# Patient Record
Sex: FEMALE | Race: OTHER | HISPANIC OR LATINO | URBAN - METROPOLITAN AREA
[De-identification: names, ages, dates, MRNs, and addresses within clinical notes are randomized per-mention and may not be internally consistent; named-entity substitution may affect disease eponyms.]

---

## 2021-09-10 ENCOUNTER — EMERGENCY (EMERGENCY)
Facility: HOSPITAL | Age: 26
LOS: 1 days | Discharge: HOME | End: 2021-09-10
Attending: EMERGENCY MEDICINE
Payer: MEDICAID

## 2021-09-10 VITALS
SYSTOLIC BLOOD PRESSURE: 104 MMHG | RESPIRATION RATE: 18 BRPM | DIASTOLIC BLOOD PRESSURE: 66 MMHG | HEART RATE: 65 BPM | TEMPERATURE: 98 F | OXYGEN SATURATION: 98 %

## 2021-09-10 LAB
ALBUMIN SERPL ELPH-MCNC: 3.6 G/DL — SIGNIFICANT CHANGE UP (ref 3.5–5)
ALP SERPL-CCNC: 106 U/L — SIGNIFICANT CHANGE UP (ref 40–120)
ALT FLD-CCNC: 24 U/L DA — SIGNIFICANT CHANGE UP (ref 10–60)
ANION GAP SERPL CALC-SCNC: 8 MMOL/L — SIGNIFICANT CHANGE UP (ref 5–17)
APPEARANCE UR: CLEAR — SIGNIFICANT CHANGE UP
AST SERPL-CCNC: 16 U/L — SIGNIFICANT CHANGE UP (ref 10–40)
BASOPHILS # BLD AUTO: 0.03 K/UL — SIGNIFICANT CHANGE UP (ref 0–0.2)
BASOPHILS NFR BLD AUTO: 0.4 % — SIGNIFICANT CHANGE UP (ref 0–2)
BILIRUB SERPL-MCNC: 0.6 MG/DL — SIGNIFICANT CHANGE UP (ref 0.2–1.2)
BILIRUB UR-MCNC: NEGATIVE — SIGNIFICANT CHANGE UP
BUN SERPL-MCNC: 8 MG/DL — SIGNIFICANT CHANGE UP (ref 7–18)
CALCIUM SERPL-MCNC: 8.6 MG/DL — SIGNIFICANT CHANGE UP (ref 8.4–10.5)
CHLORIDE SERPL-SCNC: 104 MMOL/L — SIGNIFICANT CHANGE UP (ref 96–108)
CO2 SERPL-SCNC: 24 MMOL/L — SIGNIFICANT CHANGE UP (ref 22–31)
COLOR SPEC: YELLOW — SIGNIFICANT CHANGE UP
CREAT SERPL-MCNC: 0.59 MG/DL — SIGNIFICANT CHANGE UP (ref 0.5–1.3)
DIFF PNL FLD: NEGATIVE — SIGNIFICANT CHANGE UP
EOSINOPHIL # BLD AUTO: 0.04 K/UL — SIGNIFICANT CHANGE UP (ref 0–0.5)
EOSINOPHIL NFR BLD AUTO: 0.6 % — SIGNIFICANT CHANGE UP (ref 0–6)
GLUCOSE SERPL-MCNC: 82 MG/DL — SIGNIFICANT CHANGE UP (ref 70–99)
GLUCOSE UR QL: NEGATIVE — SIGNIFICANT CHANGE UP
HCG SERPL-ACNC: HIGH MIU/ML
HCT VFR BLD CALC: 36.8 % — SIGNIFICANT CHANGE UP (ref 34.5–45)
HGB BLD-MCNC: 12.3 G/DL — SIGNIFICANT CHANGE UP (ref 11.5–15.5)
IMM GRANULOCYTES NFR BLD AUTO: 0.4 % — SIGNIFICANT CHANGE UP (ref 0–1.5)
KETONES UR-MCNC: NEGATIVE — SIGNIFICANT CHANGE UP
LEUKOCYTE ESTERASE UR-ACNC: NEGATIVE — SIGNIFICANT CHANGE UP
LYMPHOCYTES # BLD AUTO: 1.75 K/UL — SIGNIFICANT CHANGE UP (ref 1–3.3)
LYMPHOCYTES # BLD AUTO: 24.8 % — SIGNIFICANT CHANGE UP (ref 13–44)
MCHC RBC-ENTMCNC: 29 PG — SIGNIFICANT CHANGE UP (ref 27–34)
MCHC RBC-ENTMCNC: 33.4 GM/DL — SIGNIFICANT CHANGE UP (ref 32–36)
MCV RBC AUTO: 86.8 FL — SIGNIFICANT CHANGE UP (ref 80–100)
MONOCYTES # BLD AUTO: 0.5 K/UL — SIGNIFICANT CHANGE UP (ref 0–0.9)
MONOCYTES NFR BLD AUTO: 7.1 % — SIGNIFICANT CHANGE UP (ref 2–14)
NEUTROPHILS # BLD AUTO: 4.71 K/UL — SIGNIFICANT CHANGE UP (ref 1.8–7.4)
NEUTROPHILS NFR BLD AUTO: 66.7 % — SIGNIFICANT CHANGE UP (ref 43–77)
NITRITE UR-MCNC: NEGATIVE — SIGNIFICANT CHANGE UP
NRBC # BLD: 0 /100 WBCS — SIGNIFICANT CHANGE UP (ref 0–0)
PH UR: 6.5 — SIGNIFICANT CHANGE UP (ref 5–8)
PLATELET # BLD AUTO: 191 K/UL — SIGNIFICANT CHANGE UP (ref 150–400)
POTASSIUM SERPL-MCNC: 3.5 MMOL/L — SIGNIFICANT CHANGE UP (ref 3.5–5.3)
POTASSIUM SERPL-SCNC: 3.5 MMOL/L — SIGNIFICANT CHANGE UP (ref 3.5–5.3)
PROT SERPL-MCNC: 7.8 G/DL — SIGNIFICANT CHANGE UP (ref 6–8.3)
PROT UR-MCNC: 15
RBC # BLD: 4.24 M/UL — SIGNIFICANT CHANGE UP (ref 3.8–5.2)
RBC # FLD: 13 % — SIGNIFICANT CHANGE UP (ref 10.3–14.5)
SODIUM SERPL-SCNC: 136 MMOL/L — SIGNIFICANT CHANGE UP (ref 135–145)
SP GR SPEC: 1 — LOW (ref 1.01–1.02)
UROBILINOGEN FLD QL: NEGATIVE — SIGNIFICANT CHANGE UP
WBC # BLD: 7.06 K/UL — SIGNIFICANT CHANGE UP (ref 3.8–10.5)
WBC # FLD AUTO: 7.06 K/UL — SIGNIFICANT CHANGE UP (ref 3.8–10.5)

## 2021-09-10 PROCEDURE — 76801 OB US < 14 WKS SINGLE FETUS: CPT | Mod: 26

## 2021-09-10 PROCEDURE — 86901 BLOOD TYPING SEROLOGIC RH(D): CPT

## 2021-09-10 PROCEDURE — 86900 BLOOD TYPING SEROLOGIC ABO: CPT

## 2021-09-10 PROCEDURE — 76817 TRANSVAGINAL US OBSTETRIC: CPT

## 2021-09-10 PROCEDURE — 76801 OB US < 14 WKS SINGLE FETUS: CPT

## 2021-09-10 PROCEDURE — 85025 COMPLETE CBC W/AUTO DIFF WBC: CPT

## 2021-09-10 PROCEDURE — 86850 RBC ANTIBODY SCREEN: CPT

## 2021-09-10 PROCEDURE — 81001 URINALYSIS AUTO W/SCOPE: CPT

## 2021-09-10 PROCEDURE — 99284 EMERGENCY DEPT VISIT MOD MDM: CPT | Mod: 25

## 2021-09-10 PROCEDURE — 36415 COLL VENOUS BLD VENIPUNCTURE: CPT

## 2021-09-10 PROCEDURE — 80053 COMPREHEN METABOLIC PANEL: CPT

## 2021-09-10 PROCEDURE — 76817 TRANSVAGINAL US OBSTETRIC: CPT | Mod: 26

## 2021-09-10 PROCEDURE — 87086 URINE CULTURE/COLONY COUNT: CPT

## 2021-09-10 PROCEDURE — 99285 EMERGENCY DEPT VISIT HI MDM: CPT

## 2021-09-10 PROCEDURE — 84702 CHORIONIC GONADOTROPIN TEST: CPT

## 2021-09-10 NOTE — ED PROVIDER NOTE - OBJECTIVE STATEMENT
Patient , LMP  with positive home pregnancy test. Yesterday, began to have intermittent, crampy, 8/10 low back pain and pelvic pain. States it feels like contraction. No fever, cp, sob, n/v/d, dysuria, urgency, frequency, vaginal bleeding, vaginal discharge. Has not seen an ob-gyn in this pregnancy.

## 2021-09-10 NOTE — ED ADULT NURSE NOTE - DISCHARGE DATE/TIME
MD talked to pt. Pt informed that the policy is no visitors right now and MD suggests she stay in the hospital however she is not required to stay.    10-Sep-2021 14:56

## 2021-09-10 NOTE — ED PROVIDER NOTE - PATIENT PORTAL LINK FT
You can access the FollowMyHealth Patient Portal offered by [facility] by registering at the following website: [email or web address]. By joining [employer]’s FollowMyHealth portal, you will also be able to view your health information using other applications (apps) compatible with our system.

## 2021-09-10 NOTE — ED PROVIDER NOTE - NSFOLLOWUPINSTRUCTIONS_ED_ALL_ED_FT
Amenaza de aborto natural    LO QUE NECESITA SABER:    Grace amenaza de aborto ocurre cuando se tiene sangrado vaginal dentro de las primeras 20 semanas de embarazo. Eso indica que podría ocurrir un aborto natural. Grace amenaza de un aborto natural también se le conoce jinny grace amenaza de pérdida del embarazo.    INSTRUCCIONES SOBRE EL SAM HOSPITALARIA:    Regrese a la doyle de emergencias si:  •Se siente débil o que se desmaya.      •Tiene dolor o cólicos en el abdomen o en la espalda que empeoran.      •Tiene sangrado vaginal que en grace hora empapa por lo menos 1 toalla sanitaria.      •Le sale un material que parece tejido o coágulos grandes.      Llame a foley médico u obstetra si:  •Tiene fiebre.      •Tiene problemas para orinar, siente ardor o la necesidad de orinar con frecuencia.      •Tiene sangrado vaginal nuevo o que empeora.      •Tiene dolor o comezón vaginal o flujo vaginal de color amarillo o jayson o maloliente.      •Usted tiene preguntas o inquietudes acerca de foley condición o cuidado.      Cuidados personales:Los siguientes podrían ayudar a controlar los síntomas y disminuir foley riesgo de un aborto natural:  •No se ponga nada dentro de foley vagina.No tenga relaciones sexuales, no tome duchas vaginales ni use tampones. Estas acciones pueden aumentar foley riesgo de grace infección o de un aborto natural.      •Repose según le indicaron.No practique ningún ejercicio ni actividades vigorosas. Estas actividades pueden causar un parto prematuro o un aborto natural. Pregunte a foley médico cuáles actividades físicas son las apropiadas para usted.      Manténgase saludable jack el embarazo:  •Consuma alimentos saludables y variados.Los alimentos sanos pueden ayudarla a obtener las proteínas y calorías adicionales que usted necesita jack el embarazo. Los alimentos saludables incluyen frutas, verduras, pan integral, productos lácteos bajos en grasa, frijoles, yair magras y pescado. Evite la carne y pescado crudos o que no estén cocinados completamente. Consulte con foley médico en renuka que sea necesario que siga grace dieta especial.  Alimentos saludables           •Kamaili vitaminas prenatales según las indicaciones.Estas ayudan a obtener la cantidad correcta de vitaminas y minerales. También podrían ayudar a disminuir el riesgo de ciertos defectos de nacimiento.      •No consuma alcohol ni drogas ilegales.Estos pueden aumentar el riesgo de un aborto espontáneo o perjudicar al bebé.      •No fume.La nicotina y otros químicos en los cigarrillos y cigarros pueden perjudicar a foley bebé y provocar un aborto natural o un parto prematuro. Pida información a foley médico si usted actualmente fuma y necesita ayuda para dejar de fumar. Los cigarrillos electrónicos o el tabaco sin humo igualmente contienen nicotina. No use estos productos.      •Disminuya el riesgo de grace infección.Siempre lave taj samir antes de comer o preparar alimentos. No pase tiempo con gente que está enferma. Pregúntele a foley médico si necesita vacunas jinny la vacuna contra la gripe o la hepatitis B. Las vacunas pueden disminuir foley riesgo de contraer infecciones que pueden causar un aborto espontáneo.      •Controle taj afecciones médicas.Mantenga bajo control foley presión arterial y azúcar en la graciela. Mantenga un peso saludable jack el embarazo.      Acuda a taj consultas de control con foley médico u obstetra según le indicaron:Es posible que usted deba acudir a consulta con foley ginecólogo frecuentemente para que le ordene ecografías o más exámenes de garciela. Anote taj preguntas para que se acuerde de hacerlas jack taj visitas.       © Copyright [employer] 2021           back to top                          © Copyright [employer] 2021

## 2021-09-11 LAB
CULTURE RESULTS: NO GROWTH — SIGNIFICANT CHANGE UP
SPECIMEN SOURCE: SIGNIFICANT CHANGE UP

## 2021-09-12 ENCOUNTER — EMERGENCY (EMERGENCY)
Facility: HOSPITAL | Age: 26
LOS: 1 days | Discharge: HOME | End: 2021-09-12
Attending: STUDENT IN AN ORGANIZED HEALTH CARE EDUCATION/TRAINING PROGRAM
Payer: MEDICAID

## 2021-09-12 VITALS
OXYGEN SATURATION: 99 % | HEIGHT: 60 IN | HEART RATE: 72 BPM | TEMPERATURE: 98 F | RESPIRATION RATE: 16 BRPM | WEIGHT: 145.06 LBS | DIASTOLIC BLOOD PRESSURE: 70 MMHG | SYSTOLIC BLOOD PRESSURE: 107 MMHG

## 2021-09-12 DIAGNOSIS — O02.1 MISSED ABORTION: ICD-10-CM

## 2021-09-12 PROBLEM — Z78.9 OTHER SPECIFIED HEALTH STATUS: Chronic | Status: ACTIVE | Noted: 2021-09-10

## 2021-09-12 LAB — HCG SERPL-ACNC: HIGH MIU/ML

## 2021-09-12 PROCEDURE — 76817 TRANSVAGINAL US OBSTETRIC: CPT

## 2021-09-12 PROCEDURE — 99285 EMERGENCY DEPT VISIT HI MDM: CPT

## 2021-09-12 PROCEDURE — 99284 EMERGENCY DEPT VISIT MOD MDM: CPT | Mod: 25

## 2021-09-12 PROCEDURE — 76801 OB US < 14 WKS SINGLE FETUS: CPT | Mod: 26

## 2021-09-12 PROCEDURE — 76817 TRANSVAGINAL US OBSTETRIC: CPT | Mod: 26

## 2021-09-12 PROCEDURE — 76802 OB US < 14 WKS ADDL FETUS: CPT

## 2021-09-12 NOTE — CONSULT NOTE ADULT - PROBLEM SELECTOR RECOMMENDATION 9
d/c home   instructions verbalized, patient and  agree and understand plan of care  if s&s worsen return to ER  follow up tomorrow @9am at [phone] [facility] [id number] [phone]  d/w dr Garcia attending on call

## 2021-09-12 NOTE — ED PROVIDER NOTE - NS ED ROS FT
(+) pelvic pain   - no vaginal bleeding/discharge  (-) fevers, chills  (-) dizziness, lightheadedness, vision changes  (-) neck pain, stiffness  (-) cp, palpitations  (-) sob, cough, hemoptysis  (-) abd pain, n/v/d/c   (-) urinary sxs, back pain  (-) weakness, paresthesias

## 2021-09-12 NOTE — ED PROVIDER NOTE - PROGRESS NOTE DETAILS
Labs noted, hcg mildly elevated compared to last  U/s showing questionable yolk sac, no fetal pole, no fhr appreciated w R ovarian cyst  gyn consulted and examined pt - stated likely missed ab and that pt will need to f/up with clinic tomorrow at 9am. per gyn, no concern for ectopic at this time. pt appears comfortable, pelvic benign, abd benign.   stable for dc at this time w close gyn f/up.

## 2021-09-12 NOTE — ED PROVIDER NOTE - PATIENT PORTAL LINK FT
You can access the FollowMyHealth Patient Portal offered by [facility] by registering at the following website: [email or web address]. By joining [employer]’s FollowMyHealth portal, you will also be able to view your health information using other applications (apps) compatible with our system.

## 2021-09-12 NOTE — ED PROVIDER NOTE - OBJECTIVE STATEMENT
25yo F currently pregnant p/w cont pelvic pain. patient was seen in ED for same complaint 2 days ago. u/s performed showing no confirmed IUP, only gestational sac w R adnexal cyst. patient hcg approx 11k and instructed to return in 2 days for hcg f/up. patient returning today for hcg recheck, but also states pain is on R side and has not resolved/improved. patient denies syncope, cp, sob, f/c, ivf or fertilization methods, h/o ectopic, urinary sxs, vaginal bleeding otherwise.

## 2021-09-12 NOTE — CONSULT NOTE ADULT - SUBJECTIVE AND OBJECTIVE BOX
ID#[id number]  HPI: 25yo  LMP  EGA 8.3wks presented on friday to ER w pelvic pain, where she was told she's pregnant and to come back today for rpt bhcg level. Pt states pain still there 5/10 in severity on/off non radiating, no pain meds taken. Denies vb, vaginal discharge; cp, sob, dizziness, palpitations, n/v/d/c, fever; chills. No PNC yet per patient    pob/gynhx:  FT x1, sab x1, no std's; no abn pap smear; no fibroids; reg menses  pmhx: denies  pshx: appendectomy 7yrs ago no comp,  left ankle fx no comp  all: pcn-hives, sob  meds: none  sochx: no etoh/drug/tobacco use    REVIEW OF SYSTEMS: see HPI	    PE:  Vital Signs Last 24 Hrs  T(C): 36.6 (12 Sep 2021 14:27), Max: 36.6 (12 Sep 2021 14:27)  T(F): 97.9 (12 Sep 2021 14:27), Max: 97.9 (12 Sep 2021 14:27)  HR: 72 (12 Sep 2021 14:27) (72 - 72)  BP: 107/70 (12 Sep 2021 14:27) (107/70 - 107/70)  BP(mean): --  RR: 16 (12 Sep 2021 14:27) (16 - 16)  SpO2: 99% (12 Sep 2021 14:27) (99% - 99%)    abd: +bs; soft, nt, nd, no rebound or guarding  pelvis: no cmt; no vaginal bleeding or abnormal discharge; no odor, closed/long, + mild uterine tenderness; uterus approx 8wks size regular in contour, mobile. No adnexal tenderness or masses appreciated b/l     LABS:    HCG Quantitative, Serum (21 @ 15:32)    HCG Quantitative, Serum: 03567: HCG-Quantitative test interpretations: This test is intended only for the  detection & monitoring of pregnancy. For oncology studies order the tumor  marker test “HCG-TM” (hCG-Tumor Marker).  For pregnancy evaluation the reference values are as follows:  Negative:  <=4 mIU/mL  Indeterminate:  5 - 25 mIU/mL (suggest repeat testing in 72 hours)  Positive:  > 25 mIU/mL  Reference Values during Pregnancy:  Weeks after LMP* REFERENCE INTERVAL mIU/mL     3      6 - 71     4      10 - 750     5      220 - 7,100     6      160 - 32,000     7      3,700 - 164,000     8      32,000 - 150,000     9      64,000 - 151,000     10      47,000 - 187,000     12      28,000 - 211,000     14      14,000 - 63,000     15      12,000 - 71,000     16 - 18  8,000 - 58,000  * LMP:  Last Menstrual Period  HCG results of false positive and false negative for pregnancy are rare,  but can occur with this, and other, hCG tests. Lisseth- and post-menopausal  females may have mildly elevated hCG concentrations usually less than 14  mIU/mL that are constant over time. mIU/mL    RADIOLOGY & ADDITIONAL STUDIES:  sono  < from: US Transvaginal, OB (21 @ 17:18) >    EXAM:  US OB TRANSVAGINAL                          EXAM:  US OB LES THAN 14WK EA ADD GES                            PROCEDURE DATE:  2021          INTERPRETATION:  CLINICAL INFORMATION: Pregnancy, hCG rising. Follow-up from prior ultrasound 2days ago with  AP.    LMP: 2021  Estimated Gestational Age by LMP: 8 weeks 4 days    COMPARISON: September 10, 2021    Endovaginal and transabdominal pelvic sonogram. Color and Spectral Doppler was performed.    FINDINGS:  Uterus: 11.4 x 4.2x 5.0 cm    Gestational Sac Size (mean): 1.3 cm  Gestations Sac Shape : Normal.  Crown Rump Length: 3 mm  Estimated Gestational Age: 5 weeks 6 days by crown rump length.  Yolk Sac: Borderline-enlarged measuring 6 to 7 mm.  Fetal Heart Rate: Not identified possibly related to small fetal pole.    Right ovary: 3.8 x 2.3 x 2.0 cm. Intraovarian cyst measuring 1.5 x 1.3 x 1.4 cm. Normal arterial and venous waveforms.  Left ovary: 1.8 x 1.1 x 1.1 cm. Within normal limits. Normal arterial and venous waveforms.    Fluid: None.    IMPRESSION:  Intrauterine gestational sac with small fetal pole and borderline enlarged yolk sac. Fetal heart motion not identified. Findings are suspicious, but not diagnostic of, pregnancy failure. Continued follow-up withserial beta hCG and follow-up pelvic ultrasound is recommended.    Estimated gestational age of 5 weeks 6 days by crown-rump length    --- End of Report ---    < end of copied text >

## 2021-09-12 NOTE — ED PROVIDER NOTE - CLINICAL SUMMARY MEDICAL DECISION MAKING FREE TEXT BOX
pt p/w pelvic pain, recheck hcg s/p hcg 11k 2 days ago in ED w no confirmed IUP  rpt labs, u/s today and reassess

## 2021-09-12 NOTE — ED ADULT NURSE NOTE - NSIMPLEMENTINTERV_GEN_ALL_ED
Implemented All Universal Safety Interventions:  [place] to call system. Call bell, personal items and telephone within reach. Instruct patient to call for assistance. Room bathroom lighting operational. Non-slip footwear when patient is off stretcher. Physically safe environment: no spills, clutter or unnecessary equipment. Stretcher in lowest position, wheels locked, appropriate side rails in place.

## 2021-09-12 NOTE — ED PROVIDER NOTE - PHYSICAL EXAMINATION
Sohrawardy:   VS reviewed  NAD, not ill-appearing  aaox3  rrr  normal resp effort  abd soft, nondistended, +ttp midline and R-sided pelvic region

## 2021-09-20 ENCOUNTER — OUTPATIENT (OUTPATIENT)
Dept: OUTPATIENT SERVICES | Facility: HOSPITAL | Age: 26
LOS: 1 days | End: 2021-09-20
Payer: MEDICAID

## 2021-09-20 ENCOUNTER — APPOINTMENT (OUTPATIENT)
Dept: OBGYN | Facility: CLINIC | Age: 26
End: 2021-09-20
Payer: MEDICAID

## 2021-09-20 VITALS
BODY MASS INDEX: 34.96 KG/M2 | TEMPERATURE: 97.4 F | DIASTOLIC BLOOD PRESSURE: 66 MMHG | HEART RATE: 65 BPM | OXYGEN SATURATION: 100 % | HEIGHT: 62 IN | RESPIRATION RATE: 18 BRPM | WEIGHT: 190 LBS | SYSTOLIC BLOOD PRESSURE: 96 MMHG

## 2021-09-20 DIAGNOSIS — S82.92XA UNSPECIFIED FRACTURE OF LEFT LOWER LEG, INITIAL ENCOUNTER FOR CLOSED FRACTURE: ICD-10-CM

## 2021-09-20 DIAGNOSIS — Z00.00 ENCOUNTER FOR GENERAL ADULT MEDICAL EXAMINATION WITHOUT ABNORMAL FINDINGS: ICD-10-CM

## 2021-09-20 DIAGNOSIS — Z78.9 OTHER SPECIFIED HEALTH STATUS: ICD-10-CM

## 2021-09-20 PROCEDURE — 99204 OFFICE O/P NEW MOD 45 MIN: CPT | Mod: 25

## 2021-09-20 PROCEDURE — G0463: CPT

## 2021-09-20 PROCEDURE — 36415 COLL VENOUS BLD VENIPUNCTURE: CPT

## 2021-09-20 PROCEDURE — 81025 URINE PREGNANCY TEST: CPT

## 2021-09-20 PROCEDURE — 36415 COLL VENOUS BLD VENIPUNCTURE: CPT | Mod: NC

## 2021-09-20 PROCEDURE — 87591 N.GONORRHOEAE DNA AMP PROB: CPT

## 2021-09-20 NOTE — PHYSICAL EXAM
[Appropriately responsive] : appropriately responsive [Alert] : alert [No Acute Distress] : no acute distress [No Lymphadenopathy] : no lymphadenopathy [No Murmurs] : no murmurs [Soft] : soft [Non-tender] : non-tender [Non-distended] : non-distended [No HSM] : No HSM [No Lesions] : no lesions [No Mass] : no mass [Oriented x3] : oriented x3 [Examination Of The Breasts] : a normal appearance [No Masses] : no breast masses were palpable [Labia Majora] : normal [Labia Minora] : normal [No Bleeding] : There was no active vaginal bleeding [Normal] : normal

## 2021-09-20 NOTE — HISTORY OF PRESENT ILLNESS
[N] : Patient does not use contraception [Y] : Positive pregnancy history [FreeTextEntry1] : Annual gyn exam\par \par LMP(07/08/21) - LMP unsure, hx/o irregular periods, with hx/o secondary amenorrhea after last Depo shot (06/2020).   (+) POCT seen at [facility]/ ER on (09/10/21) with f/u on (09/12/21) - early IUP - dated by U/S with JESUS(04/20/22), denies any vaginal bleeding or pelvic pain.   [LMPDate] : 7/8/21 [PGHxTotal] : 3 [[facility]xFulerm] : 1 [PGHxPremature] : 0 [PGHxAbortions] : 1 [[facility] Center[facility]] : 1 [PGHxABInduced] : 0 [PGHxABSpont] : 1 [PGHxEctopic] : 0 [PGHxMultBirths] : 0 [unknown] : Patient is unsure of the date of her LMP [Menarche Age: ____] : age at menarche was [unfilled] [Currently Active] : currently active [Men] : men [Vaginal] : vaginal [No] : No

## 2021-09-21 DIAGNOSIS — Z78.9 OTHER SPECIFIED HEALTH STATUS: ICD-10-CM

## 2021-09-21 DIAGNOSIS — Z34.90 ENCOUNTER FOR SUPERVISION OF NORMAL PREGNANCY, UNSPECIFIED, UNSPECIFIED TRIMESTER: ICD-10-CM

## 2021-09-21 DIAGNOSIS — Z11.3 ENCOUNTER FOR SCREENING FOR INFECTIONS WITH A PREDOMINANTLY SEXUAL MODE OF TRANSMISSION: ICD-10-CM

## 2021-09-21 DIAGNOSIS — O20.0 THREATENED ABORTION: ICD-10-CM

## 2021-09-21 DIAGNOSIS — S82.92XA UNSPECIFIED FRACTURE OF LEFT LOWER LEG, INITIAL ENCOUNTER FOR CLOSED FRACTURE: ICD-10-CM

## 2021-09-21 DIAGNOSIS — Z12.39 ENCOUNTER FOR OTHER SCREENING FOR MALIGNANT NEOPLASM OF BREAST: ICD-10-CM

## 2021-09-21 DIAGNOSIS — O26.899 OTHER SPECIFIED PREGNANCY RELATED CONDITIONS, UNSPECIFIED TRIMESTER: ICD-10-CM

## 2021-09-21 LAB
C TRACH RRNA SPEC QL NAA+PROBE: NOT DETECTED
N GONORRHOEA RRNA SPEC QL NAA+PROBE: NOT DETECTED
SOURCE TP AMPLIFICATION: NORMAL

## 2021-09-22 LAB
ABO + RH PNL BLD: NORMAL
BASOPHILS # BLD AUTO: 0.05 K/UL
BASOPHILS NFR BLD AUTO: 0.8 %
BLD GP AB SCN SERPL QL: NORMAL
EOSINOPHIL # BLD AUTO: 0.06 K/UL
EOSINOPHIL NFR BLD AUTO: 0.9 %
HCG SERPL-MCNC: 5903 MIU/ML
HCT VFR BLD CALC: 39.7 %
HGB BLD-MCNC: 13.1 G/DL
IMM GRANULOCYTES NFR BLD AUTO: 0.3 %
LYMPHOCYTES # BLD AUTO: 1.67 K/UL
LYMPHOCYTES NFR BLD AUTO: 25.6 %
MAN DIFF?: NORMAL
MCHC RBC-ENTMCNC: 29.5 PG
MCHC RBC-ENTMCNC: 33 GM/DL
MCV RBC AUTO: 89.4 FL
MONOCYTES # BLD AUTO: 0.48 K/UL
MONOCYTES NFR BLD AUTO: 7.4 %
NEUTROPHILS # BLD AUTO: 4.24 K/UL
NEUTROPHILS NFR BLD AUTO: 65 %
PLATELET # BLD AUTO: 194 K/UL
RBC # BLD: 4.44 M/UL
RBC # FLD: 13.5 %
WBC # FLD AUTO: 6.52 K/UL

## 2021-09-23 LAB
BACTERIA UR CULT: NORMAL
CYTOLOGY CVX/VAG DOC THIN PREP: NORMAL

## 2021-10-01 ENCOUNTER — ASOB RESULT (OUTPATIENT)
Age: 26
End: 2021-10-01

## 2021-10-01 ENCOUNTER — APPOINTMENT (OUTPATIENT)
Dept: ANTEPARTUM | Facility: CLINIC | Age: 26
End: 2021-10-01
Payer: MEDICAID

## 2021-10-01 ENCOUNTER — OUTPATIENT (OUTPATIENT)
Dept: OUTPATIENT SERVICES | Facility: HOSPITAL | Age: 26
LOS: 1 days | End: 2021-10-01
Payer: MEDICAID

## 2021-10-01 ENCOUNTER — APPOINTMENT (OUTPATIENT)
Dept: OBGYN | Facility: CLINIC | Age: 26
End: 2021-10-01
Payer: MEDICAID

## 2021-10-01 VITALS
HEART RATE: 59 BPM | WEIGHT: 190 LBS | SYSTOLIC BLOOD PRESSURE: 91 MMHG | HEIGHT: 62 IN | OXYGEN SATURATION: 98 % | TEMPERATURE: 97.3 F | DIASTOLIC BLOOD PRESSURE: 56 MMHG | BODY MASS INDEX: 34.96 KG/M2 | RESPIRATION RATE: 16 BRPM

## 2021-10-01 DIAGNOSIS — R10.2 OTHER SPECIFIED PREGNANCY RELATED CONDITIONS, UNSPECIFIED TRIMESTER: ICD-10-CM

## 2021-10-01 DIAGNOSIS — Z34.90 ENCOUNTER FOR SUPERVISION OF NORMAL PREGNANCY, UNSPECIFIED, UNSPECIFIED TRIMESTER: ICD-10-CM

## 2021-10-01 DIAGNOSIS — Z00.00 ENCOUNTER FOR GENERAL ADULT MEDICAL EXAMINATION WITHOUT ABNORMAL FINDINGS: ICD-10-CM

## 2021-10-01 DIAGNOSIS — Z87.59 PERSONAL HISTORY OF OTHER COMPLICATIONS OF PREGNANCY, CHILDBIRTH AND THE PUERPERIUM: ICD-10-CM

## 2021-10-01 DIAGNOSIS — O26.899 OTHER SPECIFIED PREGNANCY RELATED CONDITIONS, UNSPECIFIED TRIMESTER: ICD-10-CM

## 2021-10-01 DIAGNOSIS — Z12.39 ENCOUNTER FOR OTHER SCREENING FOR MALIGNANT NEOPLASM OF BREAST: ICD-10-CM

## 2021-10-01 PROCEDURE — 36415 COLL VENOUS BLD VENIPUNCTURE: CPT | Mod: NC

## 2021-10-01 PROCEDURE — 76801 OB US < 14 WKS SINGLE FETUS: CPT

## 2021-10-01 PROCEDURE — 84702 CHORIONIC GONADOTROPIN TEST: CPT

## 2021-10-01 PROCEDURE — 36415 COLL VENOUS BLD VENIPUNCTURE: CPT

## 2021-10-01 PROCEDURE — G0463: CPT

## 2021-10-01 PROCEDURE — 99214 OFFICE O/P EST MOD 30 MIN: CPT

## 2021-10-01 PROCEDURE — 76817 TRANSVAGINAL US OBSTETRIC: CPT

## 2021-10-01 NOTE — HISTORY OF PRESENT ILLNESS
[FreeTextEntry1] : S/P Ob dating ultrasound today at [facility] (10/01/21) (+)IUP (+) 6 weeks missed , sent to Office for results review and management. \par \par Pateint is complaining of pelvic cramping.  Ultrasound results reviewed - options of Medical vs surgical management reviewed, after risks and complications of both procedures reviewed, patient has opted for medical management.

## 2021-10-04 ENCOUNTER — APPOINTMENT (OUTPATIENT)
Dept: OBGYN | Facility: CLINIC | Age: 26
End: 2021-10-04

## 2021-10-04 DIAGNOSIS — O02.1 MISSED ABORTION: ICD-10-CM

## 2021-10-04 LAB
BASOPHILS # BLD AUTO: 0.04 K/UL
BASOPHILS NFR BLD AUTO: 0.5 %
EOSINOPHIL # BLD AUTO: 0.11 K/UL
EOSINOPHIL NFR BLD AUTO: 1.4 %
HCG SERPL-MCNC: 880 MIU/ML
HCT VFR BLD CALC: 40.4 %
HGB BLD-MCNC: 13 G/DL
IMM GRANULOCYTES NFR BLD AUTO: 0.3 %
LYMPHOCYTES # BLD AUTO: 2.15 K/UL
LYMPHOCYTES NFR BLD AUTO: 27.3 %
MAN DIFF?: NORMAL
MCHC RBC-ENTMCNC: 29.3 PG
MCHC RBC-ENTMCNC: 32.2 GM/DL
MCV RBC AUTO: 91.2 FL
MONOCYTES # BLD AUTO: 0.59 K/UL
MONOCYTES NFR BLD AUTO: 7.5 %
NEUTROPHILS # BLD AUTO: 4.97 K/UL
NEUTROPHILS NFR BLD AUTO: 63 %
PLATELET # BLD AUTO: 193 K/UL
RBC # BLD: 4.43 M/UL
RBC # FLD: 13.4 %
WBC # FLD AUTO: 7.88 K/UL

## 2021-10-12 ENCOUNTER — OUTPATIENT (OUTPATIENT)
Dept: OUTPATIENT SERVICES | Facility: HOSPITAL | Age: 26
LOS: 1 days | End: 2021-10-12

## 2021-10-12 DIAGNOSIS — Z01.419 ENCOUNTER FOR GYNECOLOGICAL EXAMINATION (GENERAL) (ROUTINE) WITHOUT ABNORMAL FINDINGS: ICD-10-CM

## 2021-10-13 LAB — HCG SERPL-MCNC: 125 MIU/ML

## 2021-10-15 ENCOUNTER — OUTPATIENT (OUTPATIENT)
Dept: OUTPATIENT SERVICES | Facility: HOSPITAL | Age: 26
LOS: 1 days | End: 2021-10-15
Payer: MEDICAID

## 2021-10-15 ENCOUNTER — APPOINTMENT (OUTPATIENT)
Dept: OBGYN | Facility: CLINIC | Age: 26
End: 2021-10-15
Payer: MEDICAID

## 2021-10-15 VITALS
HEART RATE: 72 BPM | SYSTOLIC BLOOD PRESSURE: 95 MMHG | DIASTOLIC BLOOD PRESSURE: 59 MMHG | OXYGEN SATURATION: 99 % | WEIGHT: 192 LBS | HEIGHT: 62 IN | RESPIRATION RATE: 18 BRPM | TEMPERATURE: 97.2 F | BODY MASS INDEX: 35.33 KG/M2

## 2021-10-15 DIAGNOSIS — Z00.00 ENCOUNTER FOR GENERAL ADULT MEDICAL EXAMINATION WITHOUT ABNORMAL FINDINGS: ICD-10-CM

## 2021-10-15 DIAGNOSIS — O02.1 MISSED ABORTION: ICD-10-CM

## 2021-10-15 PROCEDURE — 84702 CHORIONIC GONADOTROPIN TEST: CPT

## 2021-10-15 PROCEDURE — 36415 COLL VENOUS BLD VENIPUNCTURE: CPT

## 2021-10-15 PROCEDURE — 36415 COLL VENOUS BLD VENIPUNCTURE: CPT | Mod: NC

## 2021-10-15 PROCEDURE — 99214 OFFICE O/P EST MOD 30 MIN: CPT | Mod: 25

## 2021-10-15 PROCEDURE — 85025 COMPLETE CBC W/AUTO DIFF WBC: CPT

## 2021-10-15 PROCEDURE — G0463: CPT

## 2021-10-15 RX ORDER — OXYCODONE AND ACETAMINOPHEN 5; 325 MG/1; MG/1
5-325 TABLET ORAL
Qty: 6 | Refills: 0 | Status: COMPLETED | COMMUNITY
Start: 2021-10-01 | End: 2021-10-15

## 2021-10-15 RX ORDER — MISOPROSTOL 200 UG/1
200 TABLET ORAL
Qty: 10 | Refills: 0 | Status: COMPLETED | COMMUNITY
Start: 2021-10-01 | End: 2021-10-15

## 2021-10-15 NOTE — HISTORY OF PRESENT ILLNESS
[FreeTextEntry1] : Presents for f/u of serial bHCG quantitative complaining of intermittent vaginal spotting and pelvic cramping since completion of medical treatment, has not had sex since procedure as instructed.\par \par bHCG dropped from 5903 (pre-treatment) to 125 on (10/12/21) [Papeardate] : 09/20/21 [LMPDate] : 09/25/21

## 2021-10-18 DIAGNOSIS — O03.4 INCOMPLETE SPONTANEOUS ABORTION WITHOUT COMPLICATION: ICD-10-CM

## 2021-10-18 LAB
BASOPHILS # BLD AUTO: 0.05 K/UL
BASOPHILS NFR BLD AUTO: 0.6 %
EOSINOPHIL # BLD AUTO: 0.07 K/UL
EOSINOPHIL NFR BLD AUTO: 0.8 %
HCG SERPL-MCNC: 78 MIU/ML
HCT VFR BLD CALC: 43.7 %
HGB BLD-MCNC: 13.9 G/DL
IMM GRANULOCYTES NFR BLD AUTO: 0.3 %
LYMPHOCYTES # BLD AUTO: 2.4 K/UL
LYMPHOCYTES NFR BLD AUTO: 27 %
MAN DIFF?: NORMAL
MCHC RBC-ENTMCNC: 29.1 PG
MCHC RBC-ENTMCNC: 31.8 GM/DL
MCV RBC AUTO: 91.4 FL
MONOCYTES # BLD AUTO: 0.61 K/UL
MONOCYTES NFR BLD AUTO: 6.9 %
NEUTROPHILS # BLD AUTO: 5.72 K/UL
NEUTROPHILS NFR BLD AUTO: 64.4 %
PLATELET # BLD AUTO: 220 K/UL
RBC # BLD: 4.78 M/UL
RBC # FLD: 13.6 %
WBC # FLD AUTO: 8.88 K/UL

## 2021-10-29 ENCOUNTER — OUTPATIENT (OUTPATIENT)
Dept: OUTPATIENT SERVICES | Facility: HOSPITAL | Age: 26
LOS: 1 days | End: 2021-10-29
Payer: MEDICAID

## 2021-10-29 ENCOUNTER — APPOINTMENT (OUTPATIENT)
Dept: OBGYN | Facility: CLINIC | Age: 26
End: 2021-10-29
Payer: MEDICAID

## 2021-10-29 VITALS
WEIGHT: 190 LBS | HEIGHT: 62 IN | TEMPERATURE: 97.7 F | DIASTOLIC BLOOD PRESSURE: 59 MMHG | SYSTOLIC BLOOD PRESSURE: 88 MMHG | RESPIRATION RATE: 16 BRPM | OXYGEN SATURATION: 96 % | BODY MASS INDEX: 34.96 KG/M2 | HEART RATE: 62 BPM

## 2021-10-29 DIAGNOSIS — Z00.00 ENCOUNTER FOR GENERAL ADULT MEDICAL EXAMINATION WITHOUT ABNORMAL FINDINGS: ICD-10-CM

## 2021-10-29 PROCEDURE — 99214 OFFICE O/P EST MOD 30 MIN: CPT | Mod: 25

## 2021-10-29 PROCEDURE — 36415 COLL VENOUS BLD VENIPUNCTURE: CPT

## 2021-10-29 PROCEDURE — 36415 COLL VENOUS BLD VENIPUNCTURE: CPT | Mod: NC

## 2021-10-29 PROCEDURE — G0463: CPT

## 2021-10-29 PROCEDURE — 84702 CHORIONIC GONADOTROPIN TEST: CPT

## 2021-10-29 NOTE — HISTORY OF PRESENT ILLNESS
[FreeTextEntry1] : Patient presents for f/u didn't obtain blood test on (10/25/21) or pelvic ultrasound:\par \par Patient denies any complaints today\par \par Has not had sex since miscarriage started. \par \par bHCG levels: 78 mIU/mL - need to repeat today, pt counseled to repeat next week and obtain ultrasound for f/u of incomplete miscarriage.  [TextBox_4] : follow up for missed

## 2021-11-01 DIAGNOSIS — O03.4 INCOMPLETE SPONTANEOUS ABORTION WITHOUT COMPLICATION: ICD-10-CM

## 2021-11-01 DIAGNOSIS — Z30.09 ENCOUNTER FOR OTHER GENERAL COUNSELING AND ADVICE ON CONTRACEPTION: ICD-10-CM

## 2021-11-01 LAB — HCG SERPL-MCNC: 22 MIU/ML

## 2021-11-02 ENCOUNTER — OUTPATIENT (OUTPATIENT)
Dept: OUTPATIENT SERVICES | Facility: HOSPITAL | Age: 26
LOS: 1 days | End: 2021-11-02
Payer: MEDICAID

## 2021-11-02 ENCOUNTER — APPOINTMENT (OUTPATIENT)
Dept: ULTRASOUND IMAGING | Facility: HOSPITAL | Age: 26
End: 2021-11-02
Payer: MEDICAID

## 2021-11-02 DIAGNOSIS — O03.4 INCOMPLETE SPONTANEOUS ABORTION WITHOUT COMPLICATION: ICD-10-CM

## 2021-11-02 PROCEDURE — 76856 US EXAM PELVIC COMPLETE: CPT

## 2021-11-02 PROCEDURE — 76856 US EXAM PELVIC COMPLETE: CPT | Mod: 26

## 2021-11-02 PROCEDURE — 76830 TRANSVAGINAL US NON-OB: CPT

## 2021-11-02 PROCEDURE — 76830 TRANSVAGINAL US NON-OB: CPT | Mod: 26

## 2021-11-05 ENCOUNTER — OUTPATIENT (OUTPATIENT)
Dept: OUTPATIENT SERVICES | Facility: HOSPITAL | Age: 26
LOS: 1 days | End: 2021-11-05
Payer: MEDICAID

## 2021-11-05 DIAGNOSIS — Z01.419 ENCOUNTER FOR GYNECOLOGICAL EXAMINATION (GENERAL) (ROUTINE) WITHOUT ABNORMAL FINDINGS: ICD-10-CM

## 2021-11-05 PROCEDURE — 84702 CHORIONIC GONADOTROPIN TEST: CPT

## 2021-11-06 LAB — HCG SERPL-MCNC: 5 MIU/ML

## 2021-11-15 ENCOUNTER — OUTPATIENT (OUTPATIENT)
Dept: OUTPATIENT SERVICES | Facility: HOSPITAL | Age: 26
LOS: 1 days | End: 2021-11-15
Payer: MEDICAID

## 2021-11-15 ENCOUNTER — APPOINTMENT (OUTPATIENT)
Dept: OBGYN | Facility: CLINIC | Age: 26
End: 2021-11-15
Payer: MEDICAID

## 2021-11-15 VITALS
DIASTOLIC BLOOD PRESSURE: 62 MMHG | OXYGEN SATURATION: 98 % | SYSTOLIC BLOOD PRESSURE: 100 MMHG | HEIGHT: 62 IN | WEIGHT: 190 LBS | HEART RATE: 71 BPM | RESPIRATION RATE: 18 BRPM | TEMPERATURE: 97.7 F | BODY MASS INDEX: 34.96 KG/M2

## 2021-11-15 DIAGNOSIS — Z00.00 ENCOUNTER FOR GENERAL ADULT MEDICAL EXAMINATION WITHOUT ABNORMAL FINDINGS: ICD-10-CM

## 2021-11-15 PROCEDURE — 99213 OFFICE O/P EST LOW 20 MIN: CPT | Mod: NC,25

## 2021-11-15 PROCEDURE — 84702 CHORIONIC GONADOTROPIN TEST: CPT

## 2021-11-15 PROCEDURE — 36415 COLL VENOUS BLD VENIPUNCTURE: CPT

## 2021-11-15 PROCEDURE — G0463: CPT

## 2021-11-15 PROCEDURE — 85025 COMPLETE CBC W/AUTO DIFF WBC: CPT

## 2021-11-15 PROCEDURE — 36415 COLL VENOUS BLD VENIPUNCTURE: CPT | Mod: NC

## 2021-11-15 NOTE — HISTORY OF PRESENT ILLNESS
[Patient reported PAP Smear was normal] : Patient reported PAP Smear was normal [TextBox_4] : pt here for follow up, recently followed for missed ab, last BHCG is 5, pt states she has been bldg irregularly since 10/31, sometimes heavy, sometimes clotting\par 11/2 TV / pelvic US showed irregular thickening of the endometrium, small fluid in the EM cavity, no sac or pole is seen, #.5 cm cystic structure near the fundal aspect of the EM cavity. \par Prior to missed ab pt reports irregular menstrual cycles\par Missed ab was a desired pregnancy but pt is open to OCPs to mitigate her DUB, denies migraine with aura, denies liver tumors  [PapSmeardate] : 9/20/21

## 2021-11-15 NOTE — COUNSELING
[Contraception/ Emergency Contraception/ Safe Sexual Practices] : contraception, emergency contraception, safe sexual practices [Preconception Care/ Fertility options] : preconception care, fertility options [Other ___] : [unfilled]

## 2021-11-15 NOTE — PHYSICAL EXAM
[Chaperone Present] : A chaperone was present in the examining room during all aspects of the physical examination [Appropriately responsive] : appropriately responsive [Labia Majora] : normal [Labia Minora] : normal [Normal] : normal [Normal Position] : in a normal position [Tenderness] : nontender [Enlarged ___ wks] : not enlarged [Uterine Adnexae] : normal [FreeTextEntry4] : vaginal bleeding noted in vaginal canal, no clots or tissue [FreeTextEntry5] : JOANNA

## 2021-11-16 DIAGNOSIS — O03.4 INCOMPLETE SPONTANEOUS ABORTION WITHOUT COMPLICATION: ICD-10-CM

## 2021-11-16 DIAGNOSIS — N93.8 OTHER SPECIFIED ABNORMAL UTERINE AND VAGINAL BLEEDING: ICD-10-CM

## 2021-11-17 LAB
BASOPHILS # BLD AUTO: 0.06 K/UL
BASOPHILS NFR BLD AUTO: 0.7 %
EOSINOPHIL # BLD AUTO: 0.04 K/UL
EOSINOPHIL NFR BLD AUTO: 0.4 %
HCG SERPL-MCNC: 4 MIU/ML
HCT VFR BLD CALC: 40.9 %
HGB BLD-MCNC: 12.8 G/DL
IMM GRANULOCYTES NFR BLD AUTO: 0.2 %
LYMPHOCYTES # BLD AUTO: 2.08 K/UL
LYMPHOCYTES NFR BLD AUTO: 23.2 %
MAN DIFF?: NORMAL
MCHC RBC-ENTMCNC: 29.4 PG
MCHC RBC-ENTMCNC: 31.3 GM/DL
MCV RBC AUTO: 94 FL
MONOCYTES # BLD AUTO: 0.65 K/UL
MONOCYTES NFR BLD AUTO: 7.3 %
NEUTROPHILS # BLD AUTO: 6.11 K/UL
NEUTROPHILS NFR BLD AUTO: 68.2 %
PLATELET # BLD AUTO: 237 K/UL
RBC # BLD: 4.35 M/UL
RBC # FLD: 13.8 %
WBC # FLD AUTO: 8.96 K/UL

## 2022-01-14 ENCOUNTER — OUTPATIENT (OUTPATIENT)
Dept: OUTPATIENT SERVICES | Facility: HOSPITAL | Age: 27
LOS: 1 days | End: 2022-01-14
Payer: MEDICAID

## 2022-01-14 ENCOUNTER — APPOINTMENT (OUTPATIENT)
Dept: OBGYN | Facility: CLINIC | Age: 27
End: 2022-01-14
Payer: MEDICAID

## 2022-01-14 VITALS
WEIGHT: 190 LBS | SYSTOLIC BLOOD PRESSURE: 118 MMHG | BODY MASS INDEX: 34.96 KG/M2 | DIASTOLIC BLOOD PRESSURE: 85 MMHG | HEIGHT: 62 IN | TEMPERATURE: 97.2 F | OXYGEN SATURATION: 97 % | HEART RATE: 65 BPM

## 2022-01-14 DIAGNOSIS — Z30.011 ENCOUNTER FOR INITIAL PRESCRIPTION OF CONTRACEPTIVE PILLS: ICD-10-CM

## 2022-01-14 DIAGNOSIS — Z00.00 ENCOUNTER FOR GENERAL ADULT MEDICAL EXAMINATION WITHOUT ABNORMAL FINDINGS: ICD-10-CM

## 2022-01-14 DIAGNOSIS — O03.4 INCOMPLETE SPONTANEOUS ABORTION W/OUT COMPLICATION: ICD-10-CM

## 2022-01-14 PROCEDURE — 85025 COMPLETE CBC W/AUTO DIFF WBC: CPT

## 2022-01-14 PROCEDURE — 36415 COLL VENOUS BLD VENIPUNCTURE: CPT

## 2022-01-14 PROCEDURE — 84146 ASSAY OF PROLACTIN: CPT

## 2022-01-14 PROCEDURE — 83002 ASSAY OF GONADOTROPIN (LH): CPT

## 2022-01-14 PROCEDURE — 36415 COLL VENOUS BLD VENIPUNCTURE: CPT | Mod: NC

## 2022-01-14 PROCEDURE — 99214 OFFICE O/P EST MOD 30 MIN: CPT | Mod: 25

## 2022-01-14 PROCEDURE — 83001 ASSAY OF GONADOTROPIN (FSH): CPT

## 2022-01-14 PROCEDURE — 84439 ASSAY OF FREE THYROXINE: CPT

## 2022-01-14 PROCEDURE — G0463: CPT

## 2022-01-14 PROCEDURE — 84443 ASSAY THYROID STIM HORMONE: CPT

## 2022-01-14 PROCEDURE — 80053 COMPREHEN METABOLIC PANEL: CPT

## 2022-01-14 PROCEDURE — 83036 HEMOGLOBIN GLYCOSYLATED A1C: CPT

## 2022-01-14 PROCEDURE — 81025 URINE PREGNANCY TEST: CPT

## 2022-01-14 PROCEDURE — 87800 DETECT AGNT MULT DNA DIREC: CPT

## 2022-01-14 NOTE — PHYSICAL EXAM
[Chaperone Present] : A chaperone was present in the examining room during all aspects of the physical examination [Appropriately responsive] : appropriately responsive [Alert] : alert [No Acute Distress] : no acute distress [Oriented x3] : oriented x3 [Labia Majora] : normal [Labia Minora] : normal [Normal] : normal

## 2022-01-14 NOTE — HISTORY OF PRESENT ILLNESS
[FreeTextEntry1] : Hx/o irregular periods, complaining of breast tenderness, thinks she may be pregnant - POCT is negative today\par \par Complaining of vaginal irritation requesting testing, not using contraception\par \par Pap(09/2021) Negative [TextBox_4] : Pt is c/o vaginal irritation

## 2022-01-15 LAB
ALBUMIN SERPL ELPH-MCNC: 4.6 G/DL
ALP BLD-CCNC: 118 U/L
ALT SERPL-CCNC: 25 U/L
ANION GAP SERPL CALC-SCNC: 13 MMOL/L
AST SERPL-CCNC: 23 U/L
BASOPHILS # BLD AUTO: 0.04 K/UL
BASOPHILS NFR BLD AUTO: 0.5 %
BILIRUB SERPL-MCNC: 0.3 MG/DL
BUN SERPL-MCNC: 11 MG/DL
CALCIUM SERPL-MCNC: 9.4 MG/DL
CHLORIDE SERPL-SCNC: 103 MMOL/L
CO2 SERPL-SCNC: 22 MMOL/L
CREAT SERPL-MCNC: 0.64 MG/DL
EOSINOPHIL # BLD AUTO: 0.07 K/UL
EOSINOPHIL NFR BLD AUTO: 0.9 %
ESTIMATED AVERAGE GLUCOSE: 103 MG/DL
FSH SERPL-MCNC: 5.2 IU/L
GLUCOSE SERPL-MCNC: 88 MG/DL
HBA1C MFR BLD HPLC: 5.2 %
HCT VFR BLD CALC: 43.3 %
HGB BLD-MCNC: 13.9 G/DL
IMM GRANULOCYTES NFR BLD AUTO: 0.4 %
LH SERPL-ACNC: 5.2 IU/L
LYMPHOCYTES # BLD AUTO: 1.92 K/UL
LYMPHOCYTES NFR BLD AUTO: 25.1 %
MAN DIFF?: NORMAL
MCHC RBC-ENTMCNC: 28.7 PG
MCHC RBC-ENTMCNC: 32.1 GM/DL
MCV RBC AUTO: 89.5 FL
MONOCYTES # BLD AUTO: 0.54 K/UL
MONOCYTES NFR BLD AUTO: 7.1 %
NEUTROPHILS # BLD AUTO: 5.04 K/UL
NEUTROPHILS NFR BLD AUTO: 66 %
PLATELET # BLD AUTO: 231 K/UL
POTASSIUM SERPL-SCNC: 4.2 MMOL/L
PROLACTIN SERPL-MCNC: 11.8 NG/ML
PROT SERPL-MCNC: 7.5 G/DL
RBC # BLD: 4.84 M/UL
RBC # FLD: 12.8 %
SODIUM SERPL-SCNC: 139 MMOL/L
T4 FREE SERPL-MCNC: 1.2 NG/DL
TSH SERPL-ACNC: 1.89 UIU/ML
WBC # FLD AUTO: 7.64 K/UL

## 2022-01-18 DIAGNOSIS — N76.1 SUBACUTE AND CHRONIC VAGINITIS: ICD-10-CM

## 2022-01-18 DIAGNOSIS — Z30.09 ENCOUNTER FOR OTHER GENERAL COUNSELING AND ADVICE ON CONTRACEPTION: ICD-10-CM

## 2022-01-18 DIAGNOSIS — Z11.3 ENCOUNTER FOR SCREENING FOR INFECTIONS WITH A PREDOMINANTLY SEXUAL MODE OF TRANSMISSION: ICD-10-CM

## 2022-01-18 DIAGNOSIS — Z32.00 ENCOUNTER FOR PREGNANCY TEST, RESULT UNKNOWN: ICD-10-CM

## 2022-01-18 DIAGNOSIS — N93.8 OTHER SPECIFIED ABNORMAL UTERINE AND VAGINAL BLEEDING: ICD-10-CM

## 2022-01-18 LAB
C TRACH RRNA SPEC QL NAA+PROBE: NOT DETECTED
CANDIDA VAG CYTO: NOT DETECTED
G VAGINALIS+PREV SP MTYP VAG QL MICRO: NOT DETECTED
N GONORRHOEA RRNA SPEC QL NAA+PROBE: NOT DETECTED
SOURCE AMPLIFICATION: NORMAL
T VAGINALIS VAG QL WET PREP: NOT DETECTED

## 2022-02-04 ENCOUNTER — OUTPATIENT (OUTPATIENT)
Dept: OUTPATIENT SERVICES | Facility: HOSPITAL | Age: 27
LOS: 1 days | End: 2022-02-04
Payer: MEDICAID

## 2022-02-04 ENCOUNTER — APPOINTMENT (OUTPATIENT)
Dept: ULTRASOUND IMAGING | Facility: HOSPITAL | Age: 27
End: 2022-02-04
Payer: MEDICAID

## 2022-02-04 DIAGNOSIS — N93.8 OTHER SPECIFIED ABNORMAL UTERINE AND VAGINAL BLEEDING: ICD-10-CM

## 2022-02-04 PROCEDURE — 76830 TRANSVAGINAL US NON-OB: CPT | Mod: 26

## 2022-02-04 PROCEDURE — 76856 US EXAM PELVIC COMPLETE: CPT | Mod: 26

## 2022-02-04 PROCEDURE — 76830 TRANSVAGINAL US NON-OB: CPT

## 2022-02-04 PROCEDURE — 76856 US EXAM PELVIC COMPLETE: CPT

## 2022-02-14 ENCOUNTER — APPOINTMENT (OUTPATIENT)
Dept: OBGYN | Facility: CLINIC | Age: 27
End: 2022-02-14
Payer: MEDICAID

## 2022-02-14 ENCOUNTER — OUTPATIENT (OUTPATIENT)
Dept: OUTPATIENT SERVICES | Facility: HOSPITAL | Age: 27
LOS: 1 days | End: 2022-02-14
Payer: MEDICAID

## 2022-02-14 VITALS
SYSTOLIC BLOOD PRESSURE: 96 MMHG | WEIGHT: 190 LBS | HEART RATE: 65 BPM | BODY MASS INDEX: 34.96 KG/M2 | DIASTOLIC BLOOD PRESSURE: 65 MMHG | OXYGEN SATURATION: 99 % | HEIGHT: 62 IN | RESPIRATION RATE: 18 BRPM | TEMPERATURE: 97.9 F

## 2022-02-14 DIAGNOSIS — Z00.00 ENCOUNTER FOR GENERAL ADULT MEDICAL EXAMINATION WITHOUT ABNORMAL FINDINGS: ICD-10-CM

## 2022-02-14 PROCEDURE — 99214 OFFICE O/P EST MOD 30 MIN: CPT

## 2022-02-14 PROCEDURE — G0463: CPT

## 2022-02-15 ENCOUNTER — APPOINTMENT (OUTPATIENT)
Dept: OBGYN | Facility: CLINIC | Age: 27
End: 2022-02-15

## 2022-02-16 DIAGNOSIS — Z11.3 ENCOUNTER FOR SCREENING FOR INFECTIONS WITH A PREDOMINANTLY SEXUAL MODE OF TRANSMISSION: ICD-10-CM

## 2022-02-16 DIAGNOSIS — N76.1 SUBACUTE AND CHRONIC VAGINITIS: ICD-10-CM

## 2022-02-16 DIAGNOSIS — Z30.09 ENCOUNTER FOR OTHER GENERAL COUNSELING AND ADVICE ON CONTRACEPTION: ICD-10-CM

## 2022-02-16 DIAGNOSIS — N93.8 OTHER SPECIFIED ABNORMAL UTERINE AND VAGINAL BLEEDING: ICD-10-CM

## 2022-02-16 DIAGNOSIS — Z32.00 ENCOUNTER FOR PREGNANCY TEST, RESULT UNKNOWN: ICD-10-CM

## 2022-02-16 NOTE — HISTORY OF PRESENT ILLNESS
[FreeTextEntry1] : Presents for f/u of ultrasound results (02/04/22):\par \par Ultrasound - Uterus / Adnexa - WNL - reviewed and reassured pt of normal findings\par \par PCOS panel(01/14/22) reviewed - WNL\par \par GC/CT/Affirm(01/14/22) Negative\par \par LMP(01/14/22)\par \par No Gyn complaints

## 2022-04-07 ENCOUNTER — INPATIENT (INPATIENT)
Facility: HOSPITAL | Age: 27
LOS: 2 days | Discharge: HOME | DRG: 832 | End: 2022-04-10
Attending: OBSTETRICS & GYNECOLOGY | Admitting: OBSTETRICS & GYNECOLOGY
Payer: MEDICAID

## 2022-04-07 VITALS
SYSTOLIC BLOOD PRESSURE: 117 MMHG | TEMPERATURE: 99 F | HEART RATE: 94 BPM | OXYGEN SATURATION: 98 % | WEIGHT: 171.96 LBS | DIASTOLIC BLOOD PRESSURE: 85 MMHG | RESPIRATION RATE: 18 BRPM | HEIGHT: 60 IN

## 2022-04-07 DIAGNOSIS — Z87.19 PERSONAL HISTORY OF OTHER DISEASES OF THE DIGESTIVE SYSTEM: Chronic | ICD-10-CM

## 2022-04-07 DIAGNOSIS — R11.10 VOMITING, UNSPECIFIED: ICD-10-CM

## 2022-04-07 LAB
ACETONE SERPL-MCNC: ABNORMAL
ALBUMIN SERPL ELPH-MCNC: 3.5 G/DL — SIGNIFICANT CHANGE UP (ref 3.5–5)
ALP SERPL-CCNC: 110 U/L — SIGNIFICANT CHANGE UP (ref 40–120)
ALT FLD-CCNC: 63 U/L DA — HIGH (ref 10–60)
ANION GAP SERPL CALC-SCNC: 14 MMOL/L — SIGNIFICANT CHANGE UP (ref 5–17)
APPEARANCE UR: CLEAR — SIGNIFICANT CHANGE UP
AST SERPL-CCNC: 29 U/L — SIGNIFICANT CHANGE UP (ref 10–40)
BACTERIA # UR AUTO: ABNORMAL /HPF
BASOPHILS # BLD AUTO: 0.03 K/UL — SIGNIFICANT CHANGE UP (ref 0–0.2)
BASOPHILS NFR BLD AUTO: 0.3 % — SIGNIFICANT CHANGE UP (ref 0–2)
BILIRUB SERPL-MCNC: 0.8 MG/DL — SIGNIFICANT CHANGE UP (ref 0.2–1.2)
BILIRUB UR-MCNC: ABNORMAL
BLD GP AB SCN SERPL QL: SIGNIFICANT CHANGE UP
BUN SERPL-MCNC: 9 MG/DL — SIGNIFICANT CHANGE UP (ref 7–18)
CALCIUM SERPL-MCNC: 8.9 MG/DL — SIGNIFICANT CHANGE UP (ref 8.4–10.5)
CHLORIDE SERPL-SCNC: 103 MMOL/L — SIGNIFICANT CHANGE UP (ref 96–108)
CO2 SERPL-SCNC: 18 MMOL/L — LOW (ref 22–31)
COLOR SPEC: YELLOW — SIGNIFICANT CHANGE UP
COMMENT - URINE: SIGNIFICANT CHANGE UP
COMMENT - URINE: SIGNIFICANT CHANGE UP
CREAT SERPL-MCNC: 0.58 MG/DL — SIGNIFICANT CHANGE UP (ref 0.5–1.3)
DIFF PNL FLD: ABNORMAL
EGFR: 128 ML/MIN/1.73M2 — SIGNIFICANT CHANGE UP
EOSINOPHIL # BLD AUTO: 0.04 K/UL — SIGNIFICANT CHANGE UP (ref 0–0.5)
EOSINOPHIL NFR BLD AUTO: 0.4 % — SIGNIFICANT CHANGE UP (ref 0–6)
EPI CELLS # UR: ABNORMAL /HPF
GLUCOSE SERPL-MCNC: 87 MG/DL — SIGNIFICANT CHANGE UP (ref 70–99)
GLUCOSE UR QL: NEGATIVE — SIGNIFICANT CHANGE UP
HCG SERPL-ACNC: HIGH MIU/ML
HCT VFR BLD CALC: 39.5 % — SIGNIFICANT CHANGE UP (ref 34.5–45)
HGB BLD-MCNC: 13.7 G/DL — SIGNIFICANT CHANGE UP (ref 11.5–15.5)
IMM GRANULOCYTES NFR BLD AUTO: 0.3 % — SIGNIFICANT CHANGE UP (ref 0–1.5)
KETONES UR-MCNC: ABNORMAL
LEUKOCYTE ESTERASE UR-ACNC: ABNORMAL
LYMPHOCYTES # BLD AUTO: 1.3 K/UL — SIGNIFICANT CHANGE UP (ref 1–3.3)
LYMPHOCYTES # BLD AUTO: 14.1 % — SIGNIFICANT CHANGE UP (ref 13–44)
MCHC RBC-ENTMCNC: 29.3 PG — SIGNIFICANT CHANGE UP (ref 27–34)
MCHC RBC-ENTMCNC: 34.7 GM/DL — SIGNIFICANT CHANGE UP (ref 32–36)
MCV RBC AUTO: 84.6 FL — SIGNIFICANT CHANGE UP (ref 80–100)
MONOCYTES # BLD AUTO: 0.66 K/UL — SIGNIFICANT CHANGE UP (ref 0–0.9)
MONOCYTES NFR BLD AUTO: 7.2 % — SIGNIFICANT CHANGE UP (ref 2–14)
NEUTROPHILS # BLD AUTO: 7.17 K/UL — SIGNIFICANT CHANGE UP (ref 1.8–7.4)
NEUTROPHILS NFR BLD AUTO: 77.7 % — HIGH (ref 43–77)
NITRITE UR-MCNC: POSITIVE
NRBC # BLD: 0 /100 WBCS — SIGNIFICANT CHANGE UP (ref 0–0)
PH UR: 6 — SIGNIFICANT CHANGE UP (ref 5–8)
PLATELET # BLD AUTO: 183 K/UL — SIGNIFICANT CHANGE UP (ref 150–400)
POTASSIUM SERPL-MCNC: 3.5 MMOL/L — SIGNIFICANT CHANGE UP (ref 3.5–5.3)
POTASSIUM SERPL-SCNC: 3.5 MMOL/L — SIGNIFICANT CHANGE UP (ref 3.5–5.3)
PROT SERPL-MCNC: 7.9 G/DL — SIGNIFICANT CHANGE UP (ref 6–8.3)
PROT UR-MCNC: 30 MG/DL
RBC # BLD: 4.67 M/UL — SIGNIFICANT CHANGE UP (ref 3.8–5.2)
RBC # FLD: 12.8 % — SIGNIFICANT CHANGE UP (ref 10.3–14.5)
RBC CASTS # UR COMP ASSIST: NEGATIVE /HPF — SIGNIFICANT CHANGE UP (ref 0–2)
SARS-COV-2 RNA SPEC QL NAA+PROBE: SIGNIFICANT CHANGE UP
SODIUM SERPL-SCNC: 135 MMOL/L — SIGNIFICANT CHANGE UP (ref 135–145)
SP GR SPEC: 1.02 — SIGNIFICANT CHANGE UP (ref 1.01–1.02)
UROBILINOGEN FLD QL: 8
WBC # BLD: 9.23 K/UL — SIGNIFICANT CHANGE UP (ref 3.8–10.5)
WBC # FLD AUTO: 9.23 K/UL — SIGNIFICANT CHANGE UP (ref 3.8–10.5)
WBC UR QL: ABNORMAL /HPF (ref 0–5)

## 2022-04-07 PROCEDURE — 76817 TRANSVAGINAL US OBSTETRIC: CPT | Mod: 26

## 2022-04-07 PROCEDURE — 76801 OB US < 14 WKS SINGLE FETUS: CPT | Mod: 26

## 2022-04-07 PROCEDURE — 99285 EMERGENCY DEPT VISIT HI MDM: CPT

## 2022-04-07 RX ORDER — METOCLOPRAMIDE HCL 10 MG
10 TABLET ORAL ONCE
Refills: 0 | Status: COMPLETED | OUTPATIENT
Start: 2022-04-07 | End: 2022-04-07

## 2022-04-07 RX ORDER — PYRIDOXINE HCL (VITAMIN B6) 100 MG
1 TABLET ORAL
Qty: 20 | Refills: 0
Start: 2022-04-07 | End: 2022-04-26

## 2022-04-07 RX ORDER — NITROFURANTOIN MACROCRYSTAL 50 MG
100 CAPSULE ORAL
Refills: 0 | Status: DISCONTINUED | OUTPATIENT
Start: 2022-04-07 | End: 2022-04-07

## 2022-04-07 RX ORDER — SODIUM CHLORIDE 9 MG/ML
1000 INJECTION INTRAMUSCULAR; INTRAVENOUS; SUBCUTANEOUS ONCE
Refills: 0 | Status: COMPLETED | OUTPATIENT
Start: 2022-04-07 | End: 2022-04-07

## 2022-04-07 RX ORDER — SODIUM CHLORIDE 9 MG/ML
1000 INJECTION, SOLUTION INTRAVENOUS
Refills: 0 | Status: DISCONTINUED | OUTPATIENT
Start: 2022-04-07 | End: 2022-04-10

## 2022-04-07 RX ORDER — PYRIDOXINE HCL (VITAMIN B6) 100 MG
25 TABLET ORAL EVERY 12 HOURS
Refills: 0 | Status: DISCONTINUED | OUTPATIENT
Start: 2022-04-07 | End: 2022-04-10

## 2022-04-07 RX ORDER — ONDANSETRON 8 MG/1
4 TABLET, FILM COATED ORAL ONCE
Refills: 0 | Status: COMPLETED | OUTPATIENT
Start: 2022-04-07 | End: 2022-04-07

## 2022-04-07 RX ORDER — CEFTRIAXONE 500 MG/1
1000 INJECTION, POWDER, FOR SOLUTION INTRAMUSCULAR; INTRAVENOUS ONCE
Refills: 0 | Status: COMPLETED | OUTPATIENT
Start: 2022-04-07 | End: 2022-04-07

## 2022-04-07 RX ORDER — DEXTROSE MONOHYDRATE, SODIUM CHLORIDE, AND POTASSIUM CHLORIDE 50; .745; 4.5 G/1000ML; G/1000ML; G/1000ML
1000 INJECTION, SOLUTION INTRAVENOUS
Refills: 0 | Status: DISCONTINUED | OUTPATIENT
Start: 2022-04-07 | End: 2022-04-10

## 2022-04-07 RX ORDER — ONDANSETRON 8 MG/1
4 TABLET, FILM COATED ORAL EVERY 6 HOURS
Refills: 0 | Status: DISCONTINUED | OUTPATIENT
Start: 2022-04-07 | End: 2022-04-08

## 2022-04-07 RX ORDER — DOXYLAMINE SUCCINATE 25 MG
1 TABLET ORAL
Qty: 20 | Refills: 0
Start: 2022-04-07 | End: 2022-04-29

## 2022-04-07 RX ORDER — DIPHENHYDRAMINE HCL 50 MG
25 CAPSULE ORAL EVERY 6 HOURS
Refills: 0 | Status: DISCONTINUED | OUTPATIENT
Start: 2022-04-07 | End: 2022-04-10

## 2022-04-07 RX ORDER — PANTOPRAZOLE SODIUM 20 MG/1
40 TABLET, DELAYED RELEASE ORAL
Refills: 0 | Status: DISCONTINUED | OUTPATIENT
Start: 2022-04-07 | End: 2022-04-10

## 2022-04-07 RX ORDER — DOXYLAMINE SUCCINATE 25 MG
1 TABLET ORAL
Qty: 20 | Refills: 0
Start: 2022-04-07 | End: 2022-04-26

## 2022-04-07 RX ADMIN — Medication 25 MILLIGRAM(S): at 14:19

## 2022-04-07 RX ADMIN — ONDANSETRON 4 MILLIGRAM(S): 8 TABLET, FILM COATED ORAL at 13:40

## 2022-04-07 RX ADMIN — DEXTROSE MONOHYDRATE, SODIUM CHLORIDE, AND POTASSIUM CHLORIDE 125 MILLILITER(S): 50; .745; 4.5 INJECTION, SOLUTION INTRAVENOUS at 15:37

## 2022-04-07 RX ADMIN — ONDANSETRON 4 MILLIGRAM(S): 8 TABLET, FILM COATED ORAL at 18:15

## 2022-04-07 RX ADMIN — Medication 25 MILLIGRAM(S): at 18:04

## 2022-04-07 RX ADMIN — CEFTRIAXONE 100 MILLIGRAM(S): 500 INJECTION, POWDER, FOR SOLUTION INTRAMUSCULAR; INTRAVENOUS at 13:40

## 2022-04-07 RX ADMIN — SODIUM CHLORIDE 1000 MILLILITER(S): 9 INJECTION INTRAMUSCULAR; INTRAVENOUS; SUBCUTANEOUS at 12:30

## 2022-04-07 RX ADMIN — ONDANSETRON 4 MILLIGRAM(S): 8 TABLET, FILM COATED ORAL at 23:32

## 2022-04-07 RX ADMIN — SODIUM CHLORIDE 1000 MILLILITER(S): 9 INJECTION, SOLUTION INTRAVENOUS at 12:33

## 2022-04-07 RX ADMIN — Medication 25 MILLIGRAM(S): at 19:16

## 2022-04-07 RX ADMIN — PANTOPRAZOLE SODIUM 40 MILLIGRAM(S): 20 TABLET, DELAYED RELEASE ORAL at 18:02

## 2022-04-07 RX ADMIN — Medication 25 MILLIGRAM(S): at 18:02

## 2022-04-07 RX ADMIN — SODIUM CHLORIDE 1000 MILLILITER(S): 9 INJECTION INTRAMUSCULAR; INTRAVENOUS; SUBCUTANEOUS at 13:11

## 2022-04-07 RX ADMIN — SODIUM CHLORIDE 1000 MILLILITER(S): 9 INJECTION INTRAMUSCULAR; INTRAVENOUS; SUBCUTANEOUS at 11:44

## 2022-04-07 RX ADMIN — Medication 10 MILLIGRAM(S): at 11:44

## 2022-04-07 RX ADMIN — Medication 25 MILLIGRAM(S): at 23:32

## 2022-04-07 RX ADMIN — SODIUM CHLORIDE 1000 MILLILITER(S): 9 INJECTION INTRAMUSCULAR; INTRAVENOUS; SUBCUTANEOUS at 12:39

## 2022-04-07 NOTE — H&P ADULT - HISTORY OF PRESENT ILLNESS
hyperemesis at 11wks  with serum acetone + and large urine ketones  +UTI in pregnancy     pt is  LMP:  hyperemesis at 11wks  with serum acetone + and large urine ketones  +UTI in pregnancy     pt is  LMP: 2022 pregnant  sonogram  today 11 6/7wks  pt admitted for hyperemesis persistent vomiting  pt reports she was taking zofran at home as instructed   -pt still vomiting here in ED  UA noted nitrites culture sent  pt is vomiting pt is for iv abx    pnc darlene in [place]    p1:  ft  9lbs in [facility]  sab 11wks d& c done   sab 8wks took medicine to pass the poc 2021    pmh: hx of kidney stone was dx 6yrs ago  psxh: hx of appy 10yrs ago    allergies: pcn rash/sob    sh: denies cigs/etoh/drug use

## 2022-04-07 NOTE — ED PROVIDER NOTE - OBJECTIVE STATEMENT
: [id number] Echo Ndiaye  27 year old female with no past medical history, , LMP 22, presents with reports that she is 11 weeks pregnant and has been vomiting for 4 days. Unable to tolerate any PO, feels weak and tired. Patient also reports abdominal pain, burning with urination and that she had vaginal bleeding last week, that was initially heavy but then was just spotting. OB gave patient Zofran 8mg ODT, but patient reports it is not helping. Denies fevers.

## 2022-04-07 NOTE — H&P ADULT - ASSESSMENT
pt is pregnant 11weeks  admitted for hyperemesis with UTI  serum acetone +   urine culture testing  pt still persistent vomiting  mild ALT elevation 63  iv zofran/phenergan suppos atc  iv abx for uti  rpt labs in am: cmp/serum acetone/ua   dr campbell requesting home car for iv therapy

## 2022-04-07 NOTE — PATIENT PROFILE ADULT - OVER THE PAST TWO WEEKS HAVE YOU FELT DOWN, DEPRESSED OR HOPELESS?
Patient says she is depressed because she has a son in [facility] and that "the father won't sign for him to come" to her./yes

## 2022-04-07 NOTE — PATIENT PROFILE ADULT - FUNCTIONAL SCREEN CURRENT LEVEL: SWALLOWING (IF SCORE 2 OR MORE FOR ANY ITEM, CONSULT REHAB SERVICES), MLM)
reports no difficulty swallowing but has not been able to keep anything down for 4 days./0 = swallows foods/liquids without difficulty

## 2022-04-07 NOTE — ED PROVIDER NOTE - CLINICAL SUMMARY MEDICAL DECISION MAKING FREE TEXT BOX
27 year old female with vomiting, abdominal pain, burning with urination and vaginal bleeding at approximately 11 weeks pregnant. Will obtain labs, urine, US and give fluids/anti nausea medications.

## 2022-04-07 NOTE — ED PROVIDER NOTE - ATTENDING CONTRIBUTION TO CARE
Patient with intractable vomiting, unable to keep down fluids. on exam epigastric tenderness to palpation, dry oral mucosa. noted serum ketones admit to GYN for IV hydration, antiemetics

## 2022-04-07 NOTE — H&P ADULT - NSHPPHYSICALEXAM_GEN_ALL_CORE
Vital Signs Last 24 Hrs  T(C): 37.1 (07 Apr 2022 10:39), Max: 37.1 (07 Apr 2022 10:39)  T(F): 98.7 (07 Apr 2022 10:39), Max: 98.7 (07 Apr 2022 10:39)  HR: 94 (07 Apr 2022 10:39) (94 - 94)  BP: 117/85 (07 Apr 2022 10:39) (117/85 - 117/85)  BP(mean): --  RR: 18 (07 Apr 2022 10:39) (18 - 18)  SpO2: 98% (07 Apr 2022 10:39) (98% - 98%)    pt appears to be weak still vomiting in ER  skin mucosae dry/cracking  abd soft NT no guarding no rebound  sve deferred  extrem warm dry good color

## 2022-04-07 NOTE — PATIENT PROFILE ADULT - FALL HARM RISK - HARM RISK INTERVENTIONS
Assistance with ambulation/Assistance OOB with selected safe patient handling equipment/Communicate Risk of Fall with Harm to all staff/Monitor gait and stability/Provide patient with walking aids - walker, cane, crutches/Reinforce activity limits and safety measures with patient and family/Tailored Fall Risk Interventions/Visual Cue: Yellow wristband and red socks/Bed in lowest position, wheels locked, appropriate side rails in place/Call bell, personal items and telephone in reach/Instruct patient to call for assistance before getting out of bed or chair/Non-slip footwear when patient is out of bed/[place] to call system/Physically safe environment - no spills, clutter or unnecessary equipment/Purposeful Proactive Rounding/Room/bathroom lighting operational, light cord in reach

## 2022-04-07 NOTE — H&P ADULT - NSHPLABSRESULTS_GEN_ALL_CORE
13.7   9.23  )-----------( 183      ( 2022 11:41 )             39.5   04-07    135  |  103  |  9   ----------------------------<  87  3.5   |  18<L>  |  0.58    Ca    8.9      2022 11:41    TPro  7.9  /  Alb  3.5  /  TBili  0.8  /  DBili  x   /  AST  29  /  ALT  63<H>  /  AlkPhos  110  04-07  Urinalysis Basic - ( 2022 11:41 )    Color: Yellow / Appearance: Clear / S.025 / pH: x  Gluc: x / Ketone: Large  / Bili: Small / Urobili: 8   Blood: x / Protein: 30 mg/dL / Nitrite: Positive   Leuk Esterase: Moderate / RBC: Negative /HPF / WBC 6-10 /HPF   Sq Epi: x / Non Sq Epi: Many /HPF / Bacteria: Moderate /HPF    []urine culture testing    B+    []us done today pending report

## 2022-04-08 LAB
ACETONE SERPL-MCNC: ABNORMAL
ALBUMIN SERPL ELPH-MCNC: 2.8 G/DL — LOW (ref 3.5–5)
ALP SERPL-CCNC: 90 U/L — SIGNIFICANT CHANGE UP (ref 40–120)
ALT FLD-CCNC: 48 U/L DA — SIGNIFICANT CHANGE UP (ref 10–60)
ANION GAP SERPL CALC-SCNC: 7 MMOL/L — SIGNIFICANT CHANGE UP (ref 5–17)
APPEARANCE UR: CLEAR — SIGNIFICANT CHANGE UP
AST SERPL-CCNC: 21 U/L — SIGNIFICANT CHANGE UP (ref 10–40)
BILIRUB SERPL-MCNC: 0.5 MG/DL — SIGNIFICANT CHANGE UP (ref 0.2–1.2)
BILIRUB SERPL-MCNC: 0.5 MG/DL — SIGNIFICANT CHANGE UP (ref 0.2–1.2)
BILIRUB UR-MCNC: NEGATIVE — SIGNIFICANT CHANGE UP
BUN SERPL-MCNC: 1 MG/DL — LOW (ref 7–18)
CALCIUM SERPL-MCNC: 8.3 MG/DL — LOW (ref 8.4–10.5)
CHLORIDE SERPL-SCNC: 110 MMOL/L — HIGH (ref 96–108)
CO2 SERPL-SCNC: 21 MMOL/L — LOW (ref 22–31)
COLOR SPEC: YELLOW — SIGNIFICANT CHANGE UP
CREAT SERPL-MCNC: 0.47 MG/DL — LOW (ref 0.5–1.3)
CREAT SERPL-MCNC: 0.47 MG/DL — LOW (ref 0.5–1.3)
CULTURE RESULTS: SIGNIFICANT CHANGE UP
DIFF PNL FLD: ABNORMAL
EGFR: 135 ML/MIN/1.73M2 — SIGNIFICANT CHANGE UP
EGFR: 135 ML/MIN/1.73M2 — SIGNIFICANT CHANGE UP
GLUCOSE SERPL-MCNC: 85 MG/DL — SIGNIFICANT CHANGE UP (ref 70–99)
GLUCOSE UR QL: NEGATIVE — SIGNIFICANT CHANGE UP
HCT VFR BLD CALC: 35.4 % — SIGNIFICANT CHANGE UP (ref 34.5–45)
HGB BLD-MCNC: 12.1 G/DL — SIGNIFICANT CHANGE UP (ref 11.5–15.5)
KETONES UR-MCNC: NEGATIVE — SIGNIFICANT CHANGE UP
LEUKOCYTE ESTERASE UR-ACNC: ABNORMAL
MCHC RBC-ENTMCNC: 29.7 PG — SIGNIFICANT CHANGE UP (ref 27–34)
MCHC RBC-ENTMCNC: 34.2 GM/DL — SIGNIFICANT CHANGE UP (ref 32–36)
MCV RBC AUTO: 86.8 FL — SIGNIFICANT CHANGE UP (ref 80–100)
MELD SCORE WITH DIALYSIS: SIGNIFICANT CHANGE UP POINTS
MELD SCORE WITHOUT DIALYSIS: SIGNIFICANT CHANGE UP POINTS
NITRITE UR-MCNC: NEGATIVE — SIGNIFICANT CHANGE UP
NRBC # BLD: 0 /100 WBCS — SIGNIFICANT CHANGE UP (ref 0–0)
PH UR: 8 — SIGNIFICANT CHANGE UP (ref 5–8)
PLATELET # BLD AUTO: 160 K/UL — SIGNIFICANT CHANGE UP (ref 150–400)
POTASSIUM SERPL-MCNC: 3.7 MMOL/L — SIGNIFICANT CHANGE UP (ref 3.5–5.3)
POTASSIUM SERPL-SCNC: 3.7 MMOL/L — SIGNIFICANT CHANGE UP (ref 3.5–5.3)
PROT SERPL-MCNC: 6.3 G/DL — SIGNIFICANT CHANGE UP (ref 6–8.3)
PROT UR-MCNC: NEGATIVE — SIGNIFICANT CHANGE UP
RBC # BLD: 4.08 M/UL — SIGNIFICANT CHANGE UP (ref 3.8–5.2)
RBC # FLD: 13.1 % — SIGNIFICANT CHANGE UP (ref 10.3–14.5)
SODIUM SERPL-SCNC: 138 MMOL/L — SIGNIFICANT CHANGE UP (ref 135–145)
SODIUM SERPL-SCNC: 139 MMOL/L — SIGNIFICANT CHANGE UP (ref 135–145)
SP GR SPEC: 1.01 — SIGNIFICANT CHANGE UP (ref 1.01–1.02)
SPECIMEN SOURCE: SIGNIFICANT CHANGE UP
UROBILINOGEN FLD QL: 4
WBC # BLD: 5.48 K/UL — SIGNIFICANT CHANGE UP (ref 3.8–10.5)
WBC # FLD AUTO: 5.48 K/UL — SIGNIFICANT CHANGE UP (ref 3.8–10.5)

## 2022-04-08 RX ORDER — CEFTRIAXONE 500 MG/1
1000 INJECTION, POWDER, FOR SOLUTION INTRAMUSCULAR; INTRAVENOUS EVERY 24 HOURS
Refills: 0 | Status: DISCONTINUED | OUTPATIENT
Start: 2022-04-08 | End: 2022-04-09

## 2022-04-08 RX ORDER — SODIUM CHLORIDE 9 MG/ML
500 INJECTION INTRAMUSCULAR; INTRAVENOUS; SUBCUTANEOUS ONCE
Refills: 0 | Status: COMPLETED | OUTPATIENT
Start: 2022-04-08 | End: 2022-04-08

## 2022-04-08 RX ORDER — ONDANSETRON 8 MG/1
4 TABLET, FILM COATED ORAL EVERY 6 HOURS
Refills: 0 | Status: DISCONTINUED | OUTPATIENT
Start: 2022-04-08 | End: 2022-04-09

## 2022-04-08 RX ADMIN — ONDANSETRON 4 MILLIGRAM(S): 8 TABLET, FILM COATED ORAL at 18:49

## 2022-04-08 RX ADMIN — ONDANSETRON 4 MILLIGRAM(S): 8 TABLET, FILM COATED ORAL at 14:02

## 2022-04-08 RX ADMIN — Medication 25 MILLIGRAM(S): at 05:20

## 2022-04-08 RX ADMIN — SODIUM CHLORIDE 1000 MILLILITER(S): 9 INJECTION INTRAMUSCULAR; INTRAVENOUS; SUBCUTANEOUS at 05:43

## 2022-04-08 RX ADMIN — ONDANSETRON 4 MILLIGRAM(S): 8 TABLET, FILM COATED ORAL at 05:20

## 2022-04-08 RX ADMIN — CEFTRIAXONE 100 MILLIGRAM(S): 500 INJECTION, POWDER, FOR SOLUTION INTRAMUSCULAR; INTRAVENOUS at 18:49

## 2022-04-08 RX ADMIN — Medication 25 MILLIGRAM(S): at 23:52

## 2022-04-08 RX ADMIN — Medication 25 MILLIGRAM(S): at 14:02

## 2022-04-08 RX ADMIN — Medication 25 MILLIGRAM(S): at 18:42

## 2022-04-08 RX ADMIN — Medication 25 MILLIGRAM(S): at 18:41

## 2022-04-08 RX ADMIN — DEXTROSE MONOHYDRATE, SODIUM CHLORIDE, AND POTASSIUM CHLORIDE 125 MILLILITER(S): 50; .745; 4.5 INJECTION, SOLUTION INTRAVENOUS at 00:23

## 2022-04-08 RX ADMIN — PANTOPRAZOLE SODIUM 40 MILLIGRAM(S): 20 TABLET, DELAYED RELEASE ORAL at 06:01

## 2022-04-08 RX ADMIN — Medication 25 MILLIGRAM(S): at 23:53

## 2022-04-08 RX ADMIN — Medication 25 MILLIGRAM(S): at 18:48

## 2022-04-08 RX ADMIN — Medication 1 TABLET(S): at 14:03

## 2022-04-08 RX ADMIN — ONDANSETRON 4 MILLIGRAM(S): 8 TABLET, FILM COATED ORAL at 23:53

## 2022-04-09 DIAGNOSIS — O21.0 MILD HYPEREMESIS GRAVIDARUM: ICD-10-CM

## 2022-04-09 LAB
ACETONE SERPL-MCNC: ABNORMAL
ALBUMIN SERPL ELPH-MCNC: 2.8 G/DL — LOW (ref 3.5–5)
ALP SERPL-CCNC: 85 U/L — SIGNIFICANT CHANGE UP (ref 40–120)
ALT FLD-CCNC: 43 U/L DA — SIGNIFICANT CHANGE UP (ref 10–60)
ANION GAP SERPL CALC-SCNC: 7 MMOL/L — SIGNIFICANT CHANGE UP (ref 5–17)
AST SERPL-CCNC: 19 U/L — SIGNIFICANT CHANGE UP (ref 10–40)
BILIRUB SERPL-MCNC: 0.4 MG/DL — SIGNIFICANT CHANGE UP (ref 0.2–1.2)
BUN SERPL-MCNC: 4 MG/DL — LOW (ref 7–18)
CALCIUM SERPL-MCNC: 8.6 MG/DL — SIGNIFICANT CHANGE UP (ref 8.4–10.5)
CHLORIDE SERPL-SCNC: 109 MMOL/L — HIGH (ref 96–108)
CO2 SERPL-SCNC: 22 MMOL/L — SIGNIFICANT CHANGE UP (ref 22–31)
CREAT SERPL-MCNC: 0.42 MG/DL — LOW (ref 0.5–1.3)
EGFR: 138 ML/MIN/1.73M2 — SIGNIFICANT CHANGE UP
GLUCOSE SERPL-MCNC: 85 MG/DL — SIGNIFICANT CHANGE UP (ref 70–99)
HCT VFR BLD CALC: 33.5 % — LOW (ref 34.5–45)
HGB BLD-MCNC: 11.4 G/DL — LOW (ref 11.5–15.5)
MCHC RBC-ENTMCNC: 29.5 PG — SIGNIFICANT CHANGE UP (ref 27–34)
MCHC RBC-ENTMCNC: 34 GM/DL — SIGNIFICANT CHANGE UP (ref 32–36)
MCV RBC AUTO: 86.8 FL — SIGNIFICANT CHANGE UP (ref 80–100)
NRBC # BLD: 0 /100 WBCS — SIGNIFICANT CHANGE UP (ref 0–0)
PLATELET # BLD AUTO: 160 K/UL — SIGNIFICANT CHANGE UP (ref 150–400)
POTASSIUM SERPL-MCNC: 3.3 MMOL/L — LOW (ref 3.5–5.3)
POTASSIUM SERPL-SCNC: 3.3 MMOL/L — LOW (ref 3.5–5.3)
PROT SERPL-MCNC: 6.4 G/DL — SIGNIFICANT CHANGE UP (ref 6–8.3)
RBC # BLD: 3.86 M/UL — SIGNIFICANT CHANGE UP (ref 3.8–5.2)
RBC # FLD: 13.1 % — SIGNIFICANT CHANGE UP (ref 10.3–14.5)
SODIUM SERPL-SCNC: 138 MMOL/L — SIGNIFICANT CHANGE UP (ref 135–145)
WBC # BLD: 6.33 K/UL — SIGNIFICANT CHANGE UP (ref 3.8–10.5)
WBC # FLD AUTO: 6.33 K/UL — SIGNIFICANT CHANGE UP (ref 3.8–10.5)

## 2022-04-09 PROCEDURE — 99231 SBSQ HOSP IP/OBS SF/LOW 25: CPT

## 2022-04-09 RX ORDER — ONDANSETRON 8 MG/1
4 TABLET, FILM COATED ORAL EVERY 6 HOURS
Refills: 0 | Status: DISCONTINUED | OUTPATIENT
Start: 2022-04-09 | End: 2022-04-09

## 2022-04-09 RX ORDER — POTASSIUM CHLORIDE 20 MEQ
10 PACKET (EA) ORAL ONCE
Refills: 0 | Status: COMPLETED | OUTPATIENT
Start: 2022-04-09 | End: 2022-04-09

## 2022-04-09 RX ORDER — ONDANSETRON 8 MG/1
4 TABLET, FILM COATED ORAL EVERY 4 HOURS
Refills: 0 | Status: DISCONTINUED | OUTPATIENT
Start: 2022-04-09 | End: 2022-04-10

## 2022-04-09 RX ADMIN — Medication 25 MILLIGRAM(S): at 18:00

## 2022-04-09 RX ADMIN — Medication 25 MILLIGRAM(S): at 18:06

## 2022-04-09 RX ADMIN — ONDANSETRON 4 MILLIGRAM(S): 8 TABLET, FILM COATED ORAL at 05:18

## 2022-04-09 RX ADMIN — ONDANSETRON 4 MILLIGRAM(S): 8 TABLET, FILM COATED ORAL at 18:05

## 2022-04-09 RX ADMIN — ONDANSETRON 4 MILLIGRAM(S): 8 TABLET, FILM COATED ORAL at 22:50

## 2022-04-09 RX ADMIN — Medication 25 MILLIGRAM(S): at 05:18

## 2022-04-09 RX ADMIN — DEXTROSE MONOHYDRATE, SODIUM CHLORIDE, AND POTASSIUM CHLORIDE 125 MILLILITER(S): 50; .745; 4.5 INJECTION, SOLUTION INTRAVENOUS at 05:37

## 2022-04-09 RX ADMIN — Medication 25 MILLIGRAM(S): at 11:32

## 2022-04-09 RX ADMIN — CEFTRIAXONE 100 MILLIGRAM(S): 500 INJECTION, POWDER, FOR SOLUTION INTRAMUSCULAR; INTRAVENOUS at 18:05

## 2022-04-09 RX ADMIN — Medication 1 TABLET(S): at 11:32

## 2022-04-09 RX ADMIN — PANTOPRAZOLE SODIUM 40 MILLIGRAM(S): 20 TABLET, DELAYED RELEASE ORAL at 06:46

## 2022-04-09 RX ADMIN — Medication 25 MILLIGRAM(S): at 23:19

## 2022-04-09 RX ADMIN — Medication 25 MILLIGRAM(S): at 11:48

## 2022-04-09 RX ADMIN — Medication 25 MILLIGRAM(S): at 22:51

## 2022-04-09 RX ADMIN — Medication 25 MILLIGRAM(S): at 05:19

## 2022-04-09 RX ADMIN — Medication 100 MILLIEQUIVALENT(S): at 11:32

## 2022-04-09 RX ADMIN — ONDANSETRON 4 MILLIGRAM(S): 8 TABLET, FILM COATED ORAL at 11:32

## 2022-04-09 NOTE — PROGRESS NOTE ADULT - PROBLEM SELECTOR PLAN 1
A/p: 26yo  @11wks admitted with hyperemesis, +serum acetone, uti   - cont ceftriaxone   - cont zofran/phenergan rectal/benadryl atc  - transition to iv zofran due to nausea   - cont ivf  - nutrition consulted  - K repletion   - will discuss w Dr. Valdes   -d/w Dr. Prisca gomez attending

## 2022-04-09 NOTE — DIETITIAN INITIAL EVALUATION ADULT - ORAL NUTRITION SUPPLEMENTS
Rec. adding Ensure Clear 1 container @TID - [employer] Flavor & to honor pt. food preferences once diet advances to solids

## 2022-04-09 NOTE — DIETITIAN INITIAL EVALUATION ADULT - EDUCATION DIETARY MODIFICATIONS
----- Message from Angelica Logan sent at 8/13/2019  8:49 AM CDT -----  Contact: patient   Patient has a referral from Dr. Mata. I was unable to find her a appointment  .Please call back at to schedule an appointment at [phone].      Thanks,  Angelica Logan    
Printed will book and mail appt   
not applicable

## 2022-04-09 NOTE — DIETITIAN INITIAL EVALUATION ADULT - PERTINENT LABORATORY DATA
04-09    138  |  109<H>  |  4<L>  ----------------------------<  85  3.3<L>   |  22  |  0.42<L>    Ca    8.6      09 Apr 2022 06:01    TPro  6.4  /  Alb  2.8<L>  /  TBili  0.4  /  DBili  x   /  AST  19  /  ALT  43  /  AlkPhos  85  04-09

## 2022-04-09 NOTE — DIETITIAN INITIAL EVALUATION ADULT - OTHER INFO
Patient from home admitted for hyperemesis at 11wks with +UTI in pregnancy. Visited pt. alert & Nigerien speaking, accepted  phone - ID# [id number], per pt. reports "having "on/off" nausea/vomiting x4days & worsening when "eating" prior to admit. Pt. states presently with n/vomiting since having "small amounts of breakfast" today, also "unable to move her bowels". Discussed with RN, obtained food preferences from pt. & willing to have Ensure Clear w/meals, encourage po intake, OB-GYN/team following. Pt. diet advance full liquid at lunch meal noted, on IV zofran/Abx., OB-GYN/team following.   Patient from home admitted for hyperemesis at 11wks with +UTI in pregnancy. Visited pt. alert & [place]an speaking, accepted  phone - ID# [id number], per pt. reports "having "on/off" nausea/vomiting x4days & worsening when "eating" prior to admit. Pt. states presently with n/vomiting since having "small amounts of breakfast" - solids/regular diet today, also "unable to move her bowels". Discussed with RN, obtained food preferences from pt. & willing to have Ensure Clear w/meals, encourage po intake, OB-GYN/team following. Pt. diet advance full liquid at lunch meal noted, on IV zofran/Abx., OB-GYN/team following.

## 2022-04-09 NOTE — DIETITIAN INITIAL EVALUATION ADULT - ADD RECOMMEND
C/w prenatal MVI/minerals daily. May consider alternate nutrition support if pt. with prolonged NPO/liquid diet as medically feasible

## 2022-04-09 NOTE — CHART NOTE - NSCHARTNOTEFT_GEN_A_CORE
Pt evaluated at bedside, sleeping. Pt states she feels better, still experiencing some nausea and sensation of increased saliva in her mouth. Pt states she has not had any episodes of vomiting since this morning. Pt tolerating full liquid diet. Pt also reports upper abdominal pain which started 3 days ago, states it is mild. Pt denies CP, SOB, fever, chills, palpitations, dizziness or any other complaints.     Vital Signs Last 24 Hrs  T(F): 98.4 (22 @ 20:22), Max: 98.4 (22 @ 20:22)  HR: 63 (22 @ 20:22) (59 - 78)  BP: 92/57 (22 @ 20:22) (92/57 - 110/53)  RR: 18 (22 @ 20:22) (18 - 18)  SpO2: 97% (22 @ 20:22) (97% - 100%)    MEDICATIONS  (STANDING):  dextrose 5% + lactated ringers with potassium chloride 20 mEq/L 1000 milliLiter(s) (125 mL/Hr) IV Continuous <Continuous>  dextrose 5% + sodium chloride 0.45%. 1000 milliLiter(s) (1000 mL/Hr) IV Continuous <Continuous>  diphenhydrAMINE 25 milliGRAM(s) Oral every 6 hours  ondansetron Injectable 4 milliGRAM(s) IV Push every 4 hours  pantoprazole    Tablet 40 milliGRAM(s) Oral before breakfast  prenatal multivitamin 1 Tablet(s) Oral daily  promethazine Suppository 25 milliGRAM(s) Rectal every 4 hours  pyridoxine 25 milliGRAM(s) Oral every 12 hours        PE  Gen: A&Ox3, NAD, appears comfortable  Abd; soft, nontender, nondistended   Pelvic: deferred                           11.4   6.33  )-----------( 160      ( 2022 06:01 )             33.5   04-    138  |  109<H>  |  4<L>  ----------------------------<  85  3.3<L>   |  22  |  0.42<L>    Ca    8.6      2022 06:01    TPro  6.4  /  Alb  2.8<L>  /  TBili  0.4  /  DBili  x   /  AST  19  /  ALT  43  /  AlkPhos  85  -    Urinalysis Basic - ( 2022 10:32 )    Color: Yellow / Appearance: Clear / S.015 / pH: x  Gluc: x / Ketone: Negative  / Bili: Negative / Urobili: 4   Blood: x / Protein: Negative / Nitrite: Negative   Leuk Esterase: Moderate / RBC: 0-2 /HPF / WBC 6-10 /HPF   Sq Epi: x / Non Sq Epi: Few /HPF / Bacteria: Trace /HPF    Culture - Urine (22 @ 18:35)   Specimen Source: Clean Catch Clean Catch (Midstream)   Culture Results:   <10,000 CFU/mL Normal Urogenital Grace       A/p: HD #2 28 y/o female 11wks pregnant admitted for hyperemesis gravidarum, UTI +Nitrites on  currently on Ancef  -per Dr. Winter, discontinue IV ancef   -advance to regular diet in AM   -f/u labs in AM   -continue current mgmt   -case d/w Dr. Winter

## 2022-04-09 NOTE — DIETITIAN INITIAL EVALUATION ADULT - PERTINENT MEDS FT
MEDICATIONS  (STANDING):  cefTRIAXone   IVPB 1000 milliGRAM(s) IV Intermittent every 24 hours  dextrose 5% + lactated ringers with potassium chloride 20 mEq/L 1000 milliLiter(s) (125 mL/Hr) IV Continuous <Continuous>  dextrose 5% + sodium chloride 0.45%. 1000 milliLiter(s) (1000 mL/Hr) IV Continuous <Continuous>  diphenhydrAMINE 25 milliGRAM(s) Oral every 6 hours  ondansetron Injectable 4 milliGRAM(s) IV Push every 6 hours  pantoprazole    Tablet 40 milliGRAM(s) Oral before breakfast  prenatal multivitamin 1 Tablet(s) Oral daily  promethazine Suppository 25 milliGRAM(s) Rectal every 6 hours  pyridoxine 25 milliGRAM(s) Oral every 12 hours    MEDICATIONS  (PRN):

## 2022-04-10 ENCOUNTER — TRANSCRIPTION ENCOUNTER (OUTPATIENT)
Age: 27
End: 2022-04-10

## 2022-04-10 VITALS
DIASTOLIC BLOOD PRESSURE: 70 MMHG | HEART RATE: 75 BPM | RESPIRATION RATE: 17 BRPM | OXYGEN SATURATION: 98 % | SYSTOLIC BLOOD PRESSURE: 109 MMHG | TEMPERATURE: 98 F

## 2022-04-10 LAB
ACETONE SERPL-MCNC: NEGATIVE — SIGNIFICANT CHANGE UP
ANION GAP SERPL CALC-SCNC: 8 MMOL/L — SIGNIFICANT CHANGE UP (ref 5–17)
BUN SERPL-MCNC: 4 MG/DL — LOW (ref 7–18)
CALCIUM SERPL-MCNC: 8.9 MG/DL — SIGNIFICANT CHANGE UP (ref 8.4–10.5)
CHLORIDE SERPL-SCNC: 107 MMOL/L — SIGNIFICANT CHANGE UP (ref 96–108)
CO2 SERPL-SCNC: 22 MMOL/L — SIGNIFICANT CHANGE UP (ref 22–31)
CREAT SERPL-MCNC: 0.47 MG/DL — LOW (ref 0.5–1.3)
EGFR: 135 ML/MIN/1.73M2 — SIGNIFICANT CHANGE UP
GLUCOSE SERPL-MCNC: 83 MG/DL — SIGNIFICANT CHANGE UP (ref 70–99)
POTASSIUM SERPL-MCNC: 3.5 MMOL/L — SIGNIFICANT CHANGE UP (ref 3.5–5.3)
POTASSIUM SERPL-SCNC: 3.5 MMOL/L — SIGNIFICANT CHANGE UP (ref 3.5–5.3)
SODIUM SERPL-SCNC: 137 MMOL/L — SIGNIFICANT CHANGE UP (ref 135–145)

## 2022-04-10 PROCEDURE — 80053 COMPREHEN METABOLIC PANEL: CPT

## 2022-04-10 PROCEDURE — 76802 OB US < 14 WKS ADDL FETUS: CPT

## 2022-04-10 PROCEDURE — 99285 EMERGENCY DEPT VISIT HI MDM: CPT

## 2022-04-10 PROCEDURE — 87086 URINE CULTURE/COLONY COUNT: CPT

## 2022-04-10 PROCEDURE — 85027 COMPLETE CBC AUTOMATED: CPT

## 2022-04-10 PROCEDURE — 85025 COMPLETE CBC W/AUTO DIFF WBC: CPT

## 2022-04-10 PROCEDURE — 84702 CHORIONIC GONADOTROPIN TEST: CPT

## 2022-04-10 PROCEDURE — 81001 URINALYSIS AUTO W/SCOPE: CPT

## 2022-04-10 PROCEDURE — 87635 SARS-COV-2 COVID-19 AMP PRB: CPT

## 2022-04-10 PROCEDURE — 86900 BLOOD TYPING SEROLOGIC ABO: CPT

## 2022-04-10 PROCEDURE — 76801 OB US < 14 WKS SINGLE FETUS: CPT

## 2022-04-10 PROCEDURE — 96361 HYDRATE IV INFUSION ADD-ON: CPT

## 2022-04-10 PROCEDURE — 76817 TRANSVAGINAL US OBSTETRIC: CPT

## 2022-04-10 PROCEDURE — 86850 RBC ANTIBODY SCREEN: CPT

## 2022-04-10 PROCEDURE — 36415 COLL VENOUS BLD VENIPUNCTURE: CPT

## 2022-04-10 PROCEDURE — 86901 BLOOD TYPING SEROLOGIC RH(D): CPT

## 2022-04-10 PROCEDURE — 82009 KETONE BODYS QUAL: CPT

## 2022-04-10 PROCEDURE — 99231 SBSQ HOSP IP/OBS SF/LOW 25: CPT

## 2022-04-10 PROCEDURE — 96374 THER/PROPH/DIAG INJ IV PUSH: CPT

## 2022-04-10 PROCEDURE — 80048 BASIC METABOLIC PNL TOTAL CA: CPT

## 2022-04-10 RX ORDER — PYRIDOXINE HCL (VITAMIN B6) 100 MG
1 TABLET ORAL
Qty: 60 | Refills: 0
Start: 2022-04-10

## 2022-04-10 RX ORDER — FAMOTIDINE 10 MG/ML
1 INJECTION INTRAVENOUS
Qty: 60 | Refills: 0
Start: 2022-04-10

## 2022-04-10 RX ORDER — ONDANSETRON 8 MG/1
1 TABLET, FILM COATED ORAL
Qty: 90 | Refills: 0
Start: 2022-04-10

## 2022-04-10 RX ADMIN — ONDANSETRON 4 MILLIGRAM(S): 8 TABLET, FILM COATED ORAL at 02:33

## 2022-04-10 RX ADMIN — Medication 25 MILLIGRAM(S): at 05:38

## 2022-04-10 RX ADMIN — ONDANSETRON 4 MILLIGRAM(S): 8 TABLET, FILM COATED ORAL at 14:47

## 2022-04-10 RX ADMIN — PANTOPRAZOLE SODIUM 40 MILLIGRAM(S): 20 TABLET, DELAYED RELEASE ORAL at 06:01

## 2022-04-10 RX ADMIN — Medication 25 MILLIGRAM(S): at 14:47

## 2022-04-10 RX ADMIN — ONDANSETRON 4 MILLIGRAM(S): 8 TABLET, FILM COATED ORAL at 05:39

## 2022-04-10 RX ADMIN — ONDANSETRON 4 MILLIGRAM(S): 8 TABLET, FILM COATED ORAL at 09:45

## 2022-04-10 RX ADMIN — Medication 25 MILLIGRAM(S): at 05:37

## 2022-04-10 RX ADMIN — Medication 25 MILLIGRAM(S): at 12:33

## 2022-04-10 RX ADMIN — Medication 25 MILLIGRAM(S): at 02:33

## 2022-04-10 RX ADMIN — Medication 25 MILLIGRAM(S): at 09:45

## 2022-04-10 RX ADMIN — Medication 25 MILLIGRAM(S): at 05:39

## 2022-04-10 RX ADMIN — Medication 1 TABLET(S): at 12:33

## 2022-04-10 NOTE — DISCHARGE NOTE PROVIDER - NSDCMRMEDTOKEN_GEN_ALL_CORE_FT
doxylamine 25 mg oral tablet: 1 tab(s) orally once a day (at bedtime)   Pepcid 20 mg oral tablet: 1 tab(s) orally 2 times a day   promethazine 25 mg rectal suppository: 1 suppository(ies) rectal every 4 hours  pyridoxine 25 mg oral tablet: 1 tab(s) orally every 12 hours  Zofran 4 mg oral tablet: 1 tab(s) orally every 4 hours

## 2022-04-10 NOTE — PROGRESS NOTE ADULT - ASSESSMENT
A/p: 26yo  @11wks admitted with hyperemesis, +serum acetone, uti   - cont ceftriaxone   - cont zofran/phenergan rectal/benadryl atc  - transition to iv zofran due to nausea   - cont ivf  - nutrition consulted  - K repletion   - will discuss w Dr. Valdes   -d/w Dr. Prisca gomez attending
a/p siup @11wks w hyperemsis resolving; stable

## 2022-04-10 NOTE — PROGRESS NOTE ADULT - SUBJECTIVE AND OBJECTIVE BOX
Pt evaluated at bedside, sitting up and eating cherries. Pt states she feels better, denies nausea or vomit. Pt states she has not had any episodes of vomiting since this morning. Pt tolerating reg diet. Pt denies any abdominal pain at this time. Pt denies CP, SOB, fever, chills, palpitations, dizziness or any other complaints.     Vital Signs Last 24 Hrs  T(C): 36.7 (10 Apr 2022 14:31), Max: 36.9 (09 Apr 2022 20:22)  T(F): 98.1 (10 Apr 2022 14:31), Max: 98.4 (09 Apr 2022 20:22)  HR: 68 (10 Apr 2022 14:31) (58 - 68)  BP: 96/43 (10 Apr 2022 14:31) (92/40 - 96/43)  BP(mean): 51 (10 Apr 2022 05:13) (51 - 51)  RR: 16 (10 Apr 2022 14:31) (16 - 18)  SpO2: 97% (10 Apr 2022 14:31) (97% - 97%)    PE:   Gen: A&Ox3; NAD  Abd: soft/nt/gravid @11wks  limitted bedside sono: +IUP w FH in 160's  back: no cvat b/l  ext: no calf pain; edema 1+b/l, dtr's 2+b/l, venodynes in place      CBC Full  -  ( 09 Apr 2022 06:01 )  WBC Count : 6.33 K/uL  RBC Count : 3.86 M/uL  Hemoglobin : 11.4 g/dL  Hematocrit : 33.5 %  Platelet Count - Automated : 160 K/uL  Mean Cell Volume : 86.8 fl  Mean Cell Hemoglobin : 29.5 pg  Mean Cell Hemoglobin Concentration : 34.0 gm/dL  Auto Neutrophil # : x  Auto Lymphocyte # : x  Auto Monocyte # : x  Auto Eosinophil # : x  Auto Basophil # : x  Auto Neutrophil % : x  Auto Lymphocyte % : x  Auto Monocyte % : x  Auto Eosinophil % : x  Auto Basophil % : x    04-10    137  |  107  |  4<L>  ----------------------------<  83  3.5   |  22  |  0.47<L>    Ca    8.9      10 Apr 2022 05:32    TPro  6.4  /  Alb  2.8<L>  /  TBili  0.4  /  DBili  x   /  AST  19  /  ALT  43  /  AlkPhos  85  04-09        
Patient seen resting comfortably.  No current complaints.  Denies HA, CP, SOB, dizziness, palpitations, worsening abdominal pain, worsening vaginal bleeding or any other concerns.  + Ambulation, + void without difficulty, + flatus and tolerating clear diet.      Vital Signs Last 24 Hrs  T(C): 37.2 (08 Apr 2022 05:19), Max: 37.2 (08 Apr 2022 05:19)  T(F): 99 (08 Apr 2022 05:19), Max: 99 (08 Apr 2022 05:19)  HR: 59 (08 Apr 2022 05:19) (59 - 78)  BP: 89/47 (08 Apr 2022 05:19) (89/47 - 117/68)  RR: 18 (08 Apr 2022 05:19) (15 - 18)  SpO2: 100% (08 Apr 2022 05:19) (99% - 100%)    Gen: A&O x3, NAD  Chest: CTABL  Cardiac: S1+S2+ RRR  Abdomen: Soft, nontender, +BS, nondistended  Gyn: No active bleeding  Extremities: Nontender, no worsening edema, DTRS 2+, venodynes intact bilaterally                          12.1   5.48  )-----------( 160      ( 08 Apr 2022 10:32 )             35.4    04-08    138  |  110<H>  |  1<L>  ----------------------------<  85  3.7   |  21<L>  |  0.47<L>    Ca    8.3<L>      08 Apr 2022 10:32    TPro  6.3  /  Alb  2.8<L>  /  TBili  0.5  /  DBili  x   /  AST  21  /  ALT  48  /  AlkPhos  90  04-08    Small serum acetone    A/P: 27 year old female at 11wks gestation admitted with hyperemesis.  Tolerating clears at this time.  Will transfer to PO meds and advance diet at dinner.      - Advance as per protocol  -OOB and ambulate  -PO zofran and pr phenergan  -Continue current care    Dr Valdes aware 
Pt seen and examined w . Pt reports nausea and gagging with 1 episode of spitting up, no emesis after eating breakfast this morning. Pt reports abdominal pain. Pt reports eating a small amount of fruit and some eggs.   Denies vaginal bleeding, vaginal discharge, pelvic pain, chest pain, shortness of breath, dizziness, palpitations, fever, chills or any other complaints 	    PE:  Vital Signs Last 24 Hrs  T(C): 36.8 (2022 05:34), Max: 37.2 (2022 14:04)  T(F): 98.2 (2022 05:34), Max: 98.9 (2022 14:04)  HR: 59 (2022 05:34) (59 - 65)  BP: 110/53 (2022 05:34) (91/37 - 110/53)  BP(mean): 67 (2022 05:34) (50 - 67)  RR: 18 (2022 05:34) (16 - 18)  SpO2: 98% (2022 05:34) (98% - 100%)    Gen: A&Ox3, NAD  abd: +bs; soft, mild epigastric tenderness, nondistended, no rebound or guarding  Ext: nontender, no edema     LABS:                        11.4   6.33  )-----------( 160      ( 2022 06:01 )             33.5     04-09    138  |  109<H>  |  4<L>  ----------------------------<  85  3.3<L>   |  22  |  0.42<L>    Ca    8.6      2022 06:01    TPro  6.4  /  Alb  2.8<L>  /  TBili  0.4  /  DBili  x   /  AST  19  /  ALT  43  /  AlkPhos  85  04-09      Urinalysis Basic - ( 2022 10:32 )    Color: Yellow / Appearance: Clear / S.015 / pH: x  Gluc: x / Ketone: Negative  / Bili: Negative / Urobili: 4   Blood: x / Protein: Negative / Nitrite: Negative   Leuk Esterase: Moderate / RBC: 0-2 /HPF / WBC 6-10 /HPF   Sq Epi: x / Non Sq Epi: Few /HPF / Bacteria: Trace /HPF    
S) pt feels better   victorina liquid diet  no vomiting since last night  c/o constipation  O) vss afebrile  abd: soft , slight tenderness   A) Hyperemesis Gravidarum  P) cont Zofran iv and phenergan pv  venna for constipation  increase diet to regular if tolerated  will arrange for home sq zofran pump   f/u in office after discharge
bhumi case management ryland covering [phone]  as per [facility] iv therapy fluids must be set  up from office  dr campbell is notified

## 2022-04-10 NOTE — DISCHARGE NOTE PROVIDER - CARE PROVIDER_API CALL
Nikolai Winter)  Obstetrics and Gynecology  [address], [address]  [place] [address]  Phone: [phone]  Fax: [phone]  Follow Up Time: 1-3 days

## 2022-04-10 NOTE — DISCHARGE NOTE NURSING/CASE MANAGEMENT/SOCIAL WORK - NSDCPEFALRISK_GEN_ALL_CORE
For information on Fall & Injury Prevention, visit: [email or web address] OR  [email or web address] OR  https://www.cdc.gov/steadi/patient.html

## 2022-04-10 NOTE — DISCHARGE NOTE PROVIDER - HOSPITAL COURSE
hyperemesis at 11wks  with serum acetone + and large urine ketones  +UTI in pregnancy     pt is  LMP: 2022 pregnant  sonogram  today 11 6/7wks  pt admitted for hyperemesis persistent vomiting  pt reports she was taking zofran at home as instructed   -pt still vomiting here in ED  UA noted nitrites culture sent  pt is vomiting pt is for iv abx    [facility] in [place]

## 2022-04-10 NOTE — PROGRESS NOTE ADULT - REASON FOR ADMISSION
hyperemesis at 11wks  with serum acetone + and large urine ketones  +UTI in pregnancy

## 2022-04-10 NOTE — PROGRESS NOTE ADULT - PROBLEM SELECTOR PLAN 1
-d/c home  -ERX zofran and phenergan   -increase fluid intake  -BRAT diet  -follow up in am with Dr Winter for Zofran pump set up  -d/w Dr Winter

## 2022-04-10 NOTE — DISCHARGE NOTE PROVIDER - NSDCCPCAREPLAN_GEN_ALL_CORE_FT
PRINCIPAL DISCHARGE DIAGNOSIS  Diagnosis: Hyperemesis gravidarum  Assessment and Plan of Treatment:       SECONDARY DISCHARGE DIAGNOSES  Diagnosis: Ketonemia  Assessment and Plan of Treatment:

## 2022-04-10 NOTE — DISCHARGE NOTE NURSING/CASE MANAGEMENT/SOCIAL WORK - PATIENT PORTAL LINK FT
You can access the FollowMyHealth Patient Portal offered by [facility] by registering at the following website: [email or web address]. By joining [employer]’s FollowMyHealth portal, you will also be able to view your health information using other applications (apps) compatible with our system.

## 2022-04-11 RX ORDER — DOXYLAMINE SUCCINATE 25 MG
1 TABLET ORAL
Qty: 20 | Refills: 0
Start: 2022-04-11 | End: 2022-04-30

## 2022-04-11 RX ORDER — ONDANSETRON 8 MG/1
1 TABLET, FILM COATED ORAL
Qty: 180 | Refills: 0
Start: 2022-04-11 | End: 2022-05-10

## 2022-04-11 RX ORDER — FAMOTIDINE 10 MG/ML
1 INJECTION INTRAVENOUS
Qty: 60 | Refills: 0
Start: 2022-04-11

## 2023-01-06 ENCOUNTER — APPOINTMENT (OUTPATIENT)
Dept: OBGYN | Facility: CLINIC | Age: 28
End: 2023-01-06
Payer: MEDICAID

## 2023-01-06 ENCOUNTER — OUTPATIENT (OUTPATIENT)
Dept: OUTPATIENT SERVICES | Facility: HOSPITAL | Age: 28
LOS: 1 days | End: 2023-01-06
Payer: MEDICAID

## 2023-01-06 VITALS
SYSTOLIC BLOOD PRESSURE: 103 MMHG | HEART RATE: 80 BPM | TEMPERATURE: 97.7 F | DIASTOLIC BLOOD PRESSURE: 65 MMHG | BODY MASS INDEX: 32.57 KG/M2 | WEIGHT: 177 LBS | OXYGEN SATURATION: 98 % | HEIGHT: 62 IN

## 2023-01-06 DIAGNOSIS — Z87.19 PERSONAL HISTORY OF OTHER DISEASES OF THE DIGESTIVE SYSTEM: Chronic | ICD-10-CM

## 2023-01-06 DIAGNOSIS — Z32.02 ENCOUNTER FOR PREGNANCY TEST, RESULT NEGATIVE: ICD-10-CM

## 2023-01-06 DIAGNOSIS — Z11.3 ENCOUNTER FOR SCREENING FOR INFECTIONS WITH A PREDOMINANTLY SEXUAL MODE OF TRANSMISSION: ICD-10-CM

## 2023-01-06 DIAGNOSIS — Z71.2 PERSON CONSULTING FOR EXPLANATION OF EXAMINATION OR TEST FINDINGS: ICD-10-CM

## 2023-01-06 DIAGNOSIS — Z01.419 ENCOUNTER FOR GYNECOLOGICAL EXAMINATION (GENERAL) (ROUTINE) W/OUT ABNORMAL FINDINGS: ICD-10-CM

## 2023-01-06 DIAGNOSIS — Z30.09 ENCOUNTER FOR OTHER GENERAL COUNSELING AND ADVICE ON CONTRACEPTION: ICD-10-CM

## 2023-01-06 DIAGNOSIS — Z00.00 ENCOUNTER FOR GENERAL ADULT MEDICAL EXAMINATION WITHOUT ABNORMAL FINDINGS: ICD-10-CM

## 2023-01-06 DIAGNOSIS — Z87.42 PERSONAL HISTORY OF OTHER DISEASES OF THE FEMALE GENITAL TRACT: ICD-10-CM

## 2023-01-06 PROCEDURE — 87591 N.GONORRHOEAE DNA AMP PROB: CPT

## 2023-01-06 PROCEDURE — G0463: CPT

## 2023-01-06 PROCEDURE — 99214 OFFICE O/P EST MOD 30 MIN: CPT | Mod: TH

## 2023-01-06 PROCEDURE — 87491 CHLMYD TRACH DNA AMP PROBE: CPT

## 2023-01-06 RX ORDER — MULTIVIT-MIN/FOLIC/VIT K/LYCOP 400-300MCG
28-0.8 TABLET ORAL DAILY
Qty: 30 | Refills: 11 | Status: ACTIVE | COMMUNITY
Start: 2021-09-21 | End: 1900-01-01

## 2023-01-06 RX ORDER — LEVONORGESTREL AND ETHINYL ESTRADIOL 0.1-0.02MG
0.1-2 KIT ORAL DAILY
Qty: 28 | Refills: 3 | Status: DISCONTINUED | COMMUNITY
Start: 2021-11-17 | End: 2023-01-06

## 2023-01-06 RX ORDER — MEDROXYPROGESTERONE ACETATE 150 MG/ML
150 INJECTION, SUSPENSION INTRAMUSCULAR
Qty: 1 | Refills: 3 | Status: ACTIVE | COMMUNITY
Start: 2023-01-06 | End: 1900-01-01

## 2023-01-06 NOTE — HISTORY OF PRESENT ILLNESS
[Last Pap Date: ___] : Last Pap Date: [unfilled] [Delivery Date: ___] : on [unfilled] [] : delivered by vaginal delivery [Wt. ___] : weighing [unfilled] [Breastfeeding] : currently nursing [BF with Difficulty] : nursing without difficulty [Resumed Menses] : has not resumed her menses [Resumed [place]] : has not resumed intercourse [Intended Contraception] : the patient does not intended to use contraception postpartum [FreeTextEntry1] : Annual\par \par S/P  (10/28/22) at [place], breastfeeding currently\par \par Contraception counseling provided, requesting Depo initiation today

## 2023-01-06 NOTE — REVIEW OF SYSTEMS
Form has been faxed  
Form received from [facility] requesting PCP signature for plan of care.  
Reviewed and signed POC for OT  Please fax back   
[Negative] : Heme/Lymph
[Negative] : Heme/Lymph

## 2023-01-06 NOTE — HISTORY OF PRESENT ILLNESS
[Last Pap Date: ___] : Last Pap Date: [unfilled] [Delivery Date: ___] : on [unfilled] [] : delivered by vaginal delivery [Wt. ___] : weighing [unfilled] [Breastfeeding] : currently nursing [BF with Difficulty] : nursing without difficulty [Resumed Menses] : has not resumed her menses [Resumed [place]] : has not resumed intercourse [Intended Contraception] : the patient does not intended to use contraception postpartum [FreeTextEntry1] : Annual\par \par S/P  (10/28/22) at [place], breastfeeding currently\par \par Contraception counseling provided, requesting Depo initiation today

## 2023-01-09 ENCOUNTER — OUTPATIENT (OUTPATIENT)
Dept: OUTPATIENT SERVICES | Facility: HOSPITAL | Age: 28
LOS: 1 days | End: 2023-01-09
Payer: MEDICAID

## 2023-01-09 ENCOUNTER — APPOINTMENT (OUTPATIENT)
Dept: OBGYN | Facility: CLINIC | Age: 28
End: 2023-01-09
Payer: MEDICAID

## 2023-01-09 VITALS
RESPIRATION RATE: 16 BRPM | DIASTOLIC BLOOD PRESSURE: 62 MMHG | WEIGHT: 177 LBS | HEART RATE: 61 BPM | SYSTOLIC BLOOD PRESSURE: 99 MMHG | OXYGEN SATURATION: 98 % | HEIGHT: 62 IN | TEMPERATURE: 97.5 F | BODY MASS INDEX: 32.57 KG/M2

## 2023-01-09 DIAGNOSIS — Z30.09 ENCOUNTER FOR OTHER GENERAL COUNSELING AND ADVICE ON CONTRACEPTION: ICD-10-CM

## 2023-01-09 DIAGNOSIS — Z87.19 PERSONAL HISTORY OF OTHER DISEASES OF THE DIGESTIVE SYSTEM: Chronic | ICD-10-CM

## 2023-01-09 DIAGNOSIS — Z32.02 ENCOUNTER FOR PREGNANCY TEST, RESULT NEGATIVE: ICD-10-CM

## 2023-01-09 DIAGNOSIS — Z11.3 ENCOUNTER FOR SCREENING FOR INFECTIONS WITH A PREDOMINANTLY SEXUAL MODE OF TRANSMISSION: ICD-10-CM

## 2023-01-09 DIAGNOSIS — Z30.013 ENCOUNTER FOR INITIAL PRESCRIPTION OF INJECTABLE CONTRACEPTIVE: ICD-10-CM

## 2023-01-09 DIAGNOSIS — Z00.00 ENCOUNTER FOR GENERAL ADULT MEDICAL EXAMINATION WITHOUT ABNORMAL FINDINGS: ICD-10-CM

## 2023-01-09 DIAGNOSIS — Z01.419 ENCOUNTER FOR GYNECOLOGICAL EXAMINATION (GENERAL) (ROUTINE) WITHOUT ABNORMAL FINDINGS: ICD-10-CM

## 2023-01-09 PROCEDURE — 99213 OFFICE O/P EST LOW 20 MIN: CPT

## 2023-01-09 PROCEDURE — G0463: CPT

## 2023-01-09 PROCEDURE — 96372 THER/PROPH/DIAG INJ SC/IM: CPT

## 2023-01-09 PROCEDURE — 81025 URINE PREGNANCY TEST: CPT

## 2023-01-10 DIAGNOSIS — Z30.42 ENCOUNTER FOR SURVEILLANCE OF INJECTABLE CONTRACEPTIVE: ICD-10-CM

## 2023-01-10 DIAGNOSIS — Z30.9 ENCOUNTER FOR CONTRACEPTIVE MANAGEMENT, UNSPECIFIED: ICD-10-CM

## 2023-01-10 NOTE — ED ADULT NURSE NOTE - NSFALLRSKHARMRISK_ED_ALL_ED
[FreeTextEntry1] : Neuro - alert and oriented x3 attn conc lang nl\par CN intact in detail\par Motor 5/5, no drift \par Sens wnl\par CSg wnl. \par  \par 
no

## 2023-01-13 LAB — CYTOLOGY CVX/VAG DOC THIN PREP: NORMAL

## 2023-02-07 NOTE — CHART NOTE - NSCHARTNOTESELECT_GEN_ALL_CORE
Event Note Oral Minoxidil Counseling- I discussed with the patient the risks of oral minoxidil including but not limited to shortness of breath, swelling of the feet or ankles, dizziness, lightheadedness, unwanted hair growth and allergic reaction.  The patient verbalized understanding of the proper use and possible adverse effects of oral minoxidil.  All of the patient's questions and concerns were addressed.

## 2023-03-27 ENCOUNTER — OUTPATIENT (OUTPATIENT)
Dept: OUTPATIENT SERVICES | Facility: HOSPITAL | Age: 28
LOS: 1 days | End: 2023-03-27
Payer: MEDICAID

## 2023-03-27 ENCOUNTER — APPOINTMENT (OUTPATIENT)
Dept: OBGYN | Facility: CLINIC | Age: 28
End: 2023-03-27
Payer: MEDICAID

## 2023-03-27 VITALS
BODY MASS INDEX: 34.23 KG/M2 | WEIGHT: 186 LBS | DIASTOLIC BLOOD PRESSURE: 68 MMHG | OXYGEN SATURATION: 100 % | TEMPERATURE: 97.2 F | SYSTOLIC BLOOD PRESSURE: 103 MMHG | HEART RATE: 84 BPM | HEIGHT: 62 IN | RESPIRATION RATE: 18 BRPM

## 2023-03-27 DIAGNOSIS — Z87.19 PERSONAL HISTORY OF OTHER DISEASES OF THE DIGESTIVE SYSTEM: Chronic | ICD-10-CM

## 2023-03-27 DIAGNOSIS — Z00.00 ENCOUNTER FOR GENERAL ADULT MEDICAL EXAMINATION WITHOUT ABNORMAL FINDINGS: ICD-10-CM

## 2023-03-27 PROCEDURE — 96372 THER/PROPH/DIAG INJ SC/IM: CPT

## 2023-03-27 PROCEDURE — G0463: CPT

## 2023-03-27 PROCEDURE — 96372 THER/PROPH/DIAG INJ SC/IM: CPT | Mod: NC

## 2023-03-27 PROCEDURE — 81025 URINE PREGNANCY TEST: CPT

## 2023-03-28 DIAGNOSIS — Z30.9 ENCOUNTER FOR CONTRACEPTIVE MANAGEMENT, UNSPECIFIED: ICD-10-CM

## 2023-03-28 DIAGNOSIS — Z30.42 ENCOUNTER FOR SURVEILLANCE OF INJECTABLE CONTRACEPTIVE: ICD-10-CM

## 2023-06-16 ENCOUNTER — OUTPATIENT (OUTPATIENT)
Dept: OUTPATIENT SERVICES | Facility: HOSPITAL | Age: 28
LOS: 1 days | End: 2023-06-16
Payer: MEDICAID

## 2023-06-16 ENCOUNTER — APPOINTMENT (OUTPATIENT)
Dept: OBGYN | Facility: CLINIC | Age: 28
End: 2023-06-16
Payer: MEDICAID

## 2023-06-16 ENCOUNTER — NON-APPOINTMENT (OUTPATIENT)
Age: 28
End: 2023-06-16

## 2023-06-16 VITALS
HEART RATE: 62 BPM | TEMPERATURE: 97.3 F | OXYGEN SATURATION: 98 % | SYSTOLIC BLOOD PRESSURE: 99 MMHG | HEIGHT: 62 IN | BODY MASS INDEX: 32.94 KG/M2 | WEIGHT: 179 LBS | DIASTOLIC BLOOD PRESSURE: 65 MMHG | RESPIRATION RATE: 18 BRPM

## 2023-06-16 DIAGNOSIS — Z00.00 ENCOUNTER FOR GENERAL ADULT MEDICAL EXAMINATION WITHOUT ABNORMAL FINDINGS: ICD-10-CM

## 2023-06-16 DIAGNOSIS — Z30.013 ENCOUNTER FOR INITIAL PRESCRIPTION OF INJECTABLE CONTRACEPTIVE: ICD-10-CM

## 2023-06-16 DIAGNOSIS — Z87.19 PERSONAL HISTORY OF OTHER DISEASES OF THE DIGESTIVE SYSTEM: Chronic | ICD-10-CM

## 2023-06-16 PROCEDURE — G0463: CPT

## 2023-06-16 PROCEDURE — 81025 URINE PREGNANCY TEST: CPT

## 2023-06-16 PROCEDURE — 96372 THER/PROPH/DIAG INJ SC/IM: CPT

## 2023-06-16 PROCEDURE — 99212 OFFICE O/P EST SF 10 MIN: CPT

## 2023-06-19 DIAGNOSIS — Z30.013 ENCOUNTER FOR INITIAL PRESCRIPTION OF INJECTABLE CONTRACEPTIVE: ICD-10-CM

## 2023-09-08 ENCOUNTER — APPOINTMENT (OUTPATIENT)
Dept: OBGYN | Facility: CLINIC | Age: 28
End: 2023-09-08

## 2023-09-14 ENCOUNTER — OUTPATIENT (OUTPATIENT)
Dept: OUTPATIENT SERVICES | Facility: HOSPITAL | Age: 28
LOS: 1 days | End: 2023-09-14
Payer: MEDICAID

## 2023-09-14 ENCOUNTER — APPOINTMENT (OUTPATIENT)
Dept: OBGYN | Facility: CLINIC | Age: 28
End: 2023-09-14
Payer: MEDICAID

## 2023-09-14 VITALS
HEART RATE: 75 BPM | DIASTOLIC BLOOD PRESSURE: 55 MMHG | HEIGHT: 62 IN | BODY MASS INDEX: 34.04 KG/M2 | SYSTOLIC BLOOD PRESSURE: 88 MMHG | RESPIRATION RATE: 18 BRPM | TEMPERATURE: 98.1 F | OXYGEN SATURATION: 97 % | WEIGHT: 185 LBS

## 2023-09-14 DIAGNOSIS — Z87.19 PERSONAL HISTORY OF OTHER DISEASES OF THE DIGESTIVE SYSTEM: Chronic | ICD-10-CM

## 2023-09-14 DIAGNOSIS — Z30.42 ENCOUNTER FOR SURVEILLANCE OF INJECTABLE CONTRACEPTIVE: ICD-10-CM

## 2023-09-14 DIAGNOSIS — Z00.00 ENCOUNTER FOR GENERAL ADULT MEDICAL EXAMINATION WITHOUT ABNORMAL FINDINGS: ICD-10-CM

## 2023-09-14 PROCEDURE — 81025 URINE PREGNANCY TEST: CPT

## 2023-09-14 PROCEDURE — G0463: CPT

## 2023-09-14 PROCEDURE — 96372 THER/PROPH/DIAG INJ SC/IM: CPT

## 2023-09-14 PROCEDURE — ZZZZZ: CPT

## 2023-09-18 DIAGNOSIS — Z30.42 ENCOUNTER FOR SURVEILLANCE OF INJECTABLE CONTRACEPTIVE: ICD-10-CM

## 2023-12-01 NOTE — ED ADULT TRIAGE NOTE - TEMPERATURE IN CELSIUS (DEGREES C)
"[id number] PROGRESS NOTE   Patient: Donya Lin  WLXBG'G Date: 2023    YOB: 1997  Admission Date: 2023    MRN: [id number]  Inpatient LOS: 1    Room: [address] [place] [address] Day: Hospital Day: 2    Subjective   HISTORY AND SUBJECTIVE COMPLAINTS     Chief Complaint:   Chief Complaint   Patient presents with   â¢ Abscess        Interval History / Subjective:   Resting in bed, wakes easily to voice. Reports ongoing pain in left axilla as well as headache. When asked about insulin, she states she has ""had a lot going on the last few months,\"" which has prevented her from picking up her insulin prescription. Denies fever, chills, lightheadedness, dizziness, presyncope, chest pain, palpitations, shortness of breath, wheezing, cough, abdominal pain/distension, n/v/d/c. Hospital Course:  Donya Lin is a 26year old female who presented on 2023 with complaints of Abscess  . ROS:  Pertinent systems negative except as above.     Objective   PHYSICAL EXAMINATION     Vital 24 Hour Range Most Recent Value   Temperature Temp  Min: 97.5 Â°F (36.4 Â°C)  Max: 98 Â°F (36.7 Â°C) 97.9 Â°F (36.6 Â°C)   Pulse Pulse  Min: 95  Max: 120 (!) 101   Respiratory Resp  Min: 16  Max: 20 17   Blood Pressure BP  Min: 121/76  Max: 188/87 123/87   Pulse Oximetry SpO2  Min: 98 %  Max: 100 % 100 %   Arterial BP No data recorded     O2 O2 Flow Rate (L/min)  Av L/min  Min: 0 L/min   Min taken time: 23 1730  Max: 0 L/min   Max taken time: 23 1730       Recorded Intake and Output:    Intake/Output Summary (Last 24 hours) at 2023 0834  Last data filed at 2023  Gross per 24 hour   Intake 360 ml   Output --   Net 360 ml      Recorded Last Stool Occurrence:       Vital Most Recent Value First Value   Weight 86 kg (189 lb 9.5 oz) Weight: 79.7 kg (175 lb 11.3 oz)   Height 5' 4\"" (162.6 cm) Height: 5' 4\"" (162.6 cm)   BMI 32.54 N/A     Physical Exam  Vitals and nursing note " reviewed. Constitutional:       General: She is not in acute distress. Appearance: Normal appearance. She is obese. She is not ill-appearing, toxic-appearing or diaphoretic. HENT:      Head: Normocephalic and atraumatic. Nose: No congestion or rhinorrhea. Mouth/Throat:      Mouth: Mucous membranes are moist.   Eyes:      General: No scleral icterus. Right eye: No discharge. Left eye: No discharge. Conjunctiva/sclera: Conjunctivae normal.   Cardiovascular:      Rate and Rhythm: Normal rate and regular rhythm. Pulses: Normal pulses. Heart sounds: Normal heart sounds. Pulmonary:      Effort: Pulmonary effort is normal. No respiratory distress. Breath sounds: Normal breath sounds. No stridor. No wheezing, rhonchi or rales. Abdominal:      General: Abdomen is flat. Bowel sounds are normal. There is no distension. Palpations: Abdomen is soft. There is no mass. Tenderness: There is no abdominal tenderness. There is no guarding or rebound. Hernia: No hernia is present. Musculoskeletal:         General: Normal range of motion. Cervical back: Normal range of motion. Right lower leg: No edema. Left lower leg: No edema. Skin:     General: Skin is warm and dry. Coloration: Skin is not jaundiced or pale. Findings: No bruising or erythema. Comments: Small puncture hole in left axilla along posterior axillary line with surrounding skin tender to palpation. Covered with dressings that are saturated with serosanguineous fluid. Neurological:      General: No focal deficit present. Mental Status: She is alert. Cranial Nerves: No cranial nerve deficit. Sensory: No sensory deficit. Motor: No weakness. Coordination: Coordination normal.   Psychiatric:         Mood and Affect: Mood normal.         Behavior: Behavior normal.         Thought Content:  Thought content normal.         Judgment: Judgment normal. TEST RESULTS     Labs: The Laboratory values listed below have been reviewed and pertinent findings discussed in the Assessment and Plan. WBC (K/mcL)   Date Value   12/01/2023 12.4 (H)     RBC (mil/mcL)   Date Value   12/01/2023 4.07     HCT (%)   Date Value   12/01/2023 36.7     HGB (g/dL)   Date Value   12/01/2023 12.2     PLT (K/mcL)   Date Value   12/01/2023 346     Sodium (mmol/L)   Date Value   12/01/2023 137     Potassium (mmol/L)   Date Value   12/01/2023 4.0     Chloride (mmol/L)   Date Value   12/01/2023 103     Glucose (mg/dL)   Date Value   12/01/2023 211 (H)     Calcium (mg/dL)   Date Value   12/01/2023 8.8     Carbon Dioxide (mmol/L)   Date Value   12/01/2023 25     BUN (mg/dL)   Date Value   12/01/2023 8     Creatinine (mg/dL)   Date Value   12/01/2023 0.45 (L)         Radiology: Imaging studies have been reviewed and pertinent findings discussed in the Assessment and Plan. Results for orders placed or performed during the hospital encounter of 11/30/23 (from the past 48 hour(s))   CT CHEST W CONTRAST    Impression    IMPRESSION:    Subcutaneous circumscribed fluid collection in the lateral aspect of the  left chest wall, may represent an abscess or hematoma. I, Attending Radiologist Carolina Farah MD, have reviewed the images and  report and concur with these findings interpreted by Resident Radiologist,  Choco Roberson MD.    Electronically Signed by: Carolina Farah MD  Signed on: 11/30/2023 3:53 PM  Created on Workstation ID: [id number]  Signed on Workstation ID: [id number]     Diet:  Npo Diet With Exceptions; Ice Chips, Sips of Water, Medications  Telemetry: On  Consults:    IP CONSULT TO GENERAL SURGERY  Therapy Orders:   No orders of the defined types were placed in this encounter. ADVANCED DIRECTIVES     Code Status: Full Resuscitation         ASSESSMENT AND PLAN     Left axillary abscess  -s/p I&D in ED prior to admission.  General surgery consulted; anticipate debridement this afternoon  -continue abx; Cultures pending. Per discussion with general surgery will keep NPO and consult wound care for further assessment. DM2  -historically very poorly controlled secondary to noncompliance. -continue SSI, carb-controlled diet (to resume after general surgery assessment)  -follows with outpatient endocrinology. Hyponatremia  -currently resolved. Continue to monitor. Asthma  -does not use inhalers at home. Continue PRN albuterol    Obesity (BMI 32)      35 minutes spent reviewing the patient's chart (vitals, labs, imaging, history) and plan of care. The patient was reviewed with attending physician Dr. Robi Plascencia. Please refer to additional notes as available. Smoking status: current smoker    Nutrition status: appropriate  Body mass index is 32.54 kg/mÂ². - Obese (BMI 30-39 without a comorbid condition or 30-34 with a comorbid condition)  DVT Prophylaxis: Ambulatory         DISCHARGE PLANNING     The patient's treatment plans were discussed with patient, RN,  and consultant(s). Recommendations for Discharge   SW     PT     OT     SLP        Anticipated discharge destination: Home  Expected Discharge Date: 12/2/23  Barriers to Discharge: Patient is not medically ready and needs to remain in the hospital today due to evaluation, intervention for abscess        [address] [place] [address] Dr [facility]  12/1/2023  8:34 AM    Physician Note     I saw and examined the patient. The patients symptoms, physical findings, and studies, are as documented above by the Physician Assistant;  I discussed the patients treatment plans with the patient.   Data Reviewed by me included All lab studies and radiology studies and results done in ER were reviewed and viewed by me    Portions of the History, Physical Examination and MDM Medical Decision Making were performed by the Physician Assistant and portions of the History, Physical Examination and MDM Medical Decision Making and complete History, Physical Examination and MDM Medical Decision Making were done by me. My Findings include   Plan for OR today for I+D. Otherwise once source controlled will send home with bactrim. My assessment and plan are as documented above by the Physician Assistant, and I have reviewed and edited the above note as needed. My Total Physician time of 10 minutes was less than the Physician Assistant's time.     Violet Lopez MD  12/1/2023  2:53 PM 36.6

## 2023-12-04 ENCOUNTER — APPOINTMENT (OUTPATIENT)
Dept: OBGYN | Facility: CLINIC | Age: 28
End: 2023-12-04

## 2023-12-28 NOTE — DISCHARGE NOTE PROVIDER - PROVIDER TOKENS
Called [facility] back. Relayed message from Dr. Renner.   Rae Jiménez RN     PROVIDER:[TOKEN:[[id number]],FOLLOWUP:[1-3 days]]
